# Patient Record
Sex: FEMALE | Race: BLACK OR AFRICAN AMERICAN | URBAN - METROPOLITAN AREA
[De-identification: names, ages, dates, MRNs, and addresses within clinical notes are randomized per-mention and may not be internally consistent; named-entity substitution may affect disease eponyms.]

---

## 2017-01-27 ENCOUNTER — INPATIENT (INPATIENT)
Facility: HOSPITAL | Age: 82
LOS: 20 days | Discharge: NURSING, REHAB OR LONG-TERM CARE | End: 2017-02-17
Attending: INTERNAL MEDICINE | Admitting: INTERNAL MEDICINE
Payer: MEDICARE

## 2017-01-27 VITALS
DIASTOLIC BLOOD PRESSURE: 49 MMHG | SYSTOLIC BLOOD PRESSURE: 104 MMHG | RESPIRATION RATE: 29 BRPM | HEART RATE: 86 BPM | OXYGEN SATURATION: 95 %

## 2017-01-27 DIAGNOSIS — A41.9 SEPSIS, UNSPECIFIED ORGANISM: ICD-10-CM

## 2017-01-27 PROBLEM — Z00.00 ENCOUNTER FOR PREVENTIVE HEALTH EXAMINATION: Status: ACTIVE | Noted: 2017-01-27

## 2017-01-27 LAB
ALBUMIN SERPL ELPH-MCNC: 2.3 G/DL — LOW (ref 3.3–5)
ALP SERPL-CCNC: 79 U/L — SIGNIFICANT CHANGE UP (ref 40–120)
ALT FLD-CCNC: 16 U/L — SIGNIFICANT CHANGE UP (ref 4–33)
APPEARANCE UR: SIGNIFICANT CHANGE UP
APTT BLD: 29.5 SEC — SIGNIFICANT CHANGE UP (ref 27.5–37.4)
AST SERPL-CCNC: 38 U/L — HIGH (ref 4–32)
B-OH-BUTYR SERPL-SCNC: 0.3 MMOL/L — SIGNIFICANT CHANGE UP (ref 0–0.4)
BACTERIA # UR AUTO: SIGNIFICANT CHANGE UP
BASE EXCESS BLDV CALC-SCNC: -8.5 MMOL/L — SIGNIFICANT CHANGE UP
BASE EXCESS BLDV CALC-SCNC: -8.8 MMOL/L — SIGNIFICANT CHANGE UP
BASOPHILS # BLD AUTO: 0.05 K/UL — SIGNIFICANT CHANGE UP (ref 0–0.2)
BASOPHILS NFR BLD AUTO: 0.2 % — SIGNIFICANT CHANGE UP (ref 0–2)
BASOPHILS NFR SPEC: 0 % — SIGNIFICANT CHANGE UP (ref 0–2)
BILIRUB SERPL-MCNC: 0.9 MG/DL — SIGNIFICANT CHANGE UP (ref 0.2–1.2)
BILIRUB UR-MCNC: NEGATIVE — SIGNIFICANT CHANGE UP
BLOOD GAS VENOUS - CREATININE: 3.77 MG/DL — HIGH (ref 0.5–1.3)
BLOOD GAS VENOUS - CREATININE: 3.89 MG/DL — HIGH (ref 0.5–1.3)
BLOOD UR QL VISUAL: NEGATIVE — SIGNIFICANT CHANGE UP
BUN SERPL-MCNC: 76 MG/DL — HIGH (ref 7–23)
CALCIUM SERPL-MCNC: 8.8 MG/DL — SIGNIFICANT CHANGE UP (ref 8.4–10.5)
CHLORIDE BLDV-SCNC: 101 MMOL/L — SIGNIFICANT CHANGE UP (ref 96–108)
CHLORIDE BLDV-SCNC: 103 MMOL/L — SIGNIFICANT CHANGE UP (ref 96–108)
CHLORIDE SERPL-SCNC: 96 MMOL/L — LOW (ref 98–107)
CK MB BLD-MCNC: 1.3 — SIGNIFICANT CHANGE UP (ref 0–2.5)
CK MB BLD-MCNC: 5.56 NG/ML — HIGH (ref 1–4.7)
CK SERPL-CCNC: 417 U/L — HIGH (ref 25–170)
CO2 SERPL-SCNC: 13 MMOL/L — LOW (ref 22–31)
COLOR SPEC: YELLOW — SIGNIFICANT CHANGE UP
CREAT SERPL-MCNC: 3.62 MG/DL — HIGH (ref 0.5–1.3)
EOSINOPHIL # BLD AUTO: 0.05 K/UL — SIGNIFICANT CHANGE UP (ref 0–0.5)
EOSINOPHIL NFR BLD AUTO: 0.2 % — SIGNIFICANT CHANGE UP (ref 0–6)
EOSINOPHIL NFR FLD: 0 % — SIGNIFICANT CHANGE UP (ref 0–6)
GAS PNL BLDV: 129 MMOL/L — LOW (ref 136–146)
GAS PNL BLDV: 129 MMOL/L — LOW (ref 136–146)
GLUCOSE BLDV-MCNC: 200 — HIGH (ref 70–99)
GLUCOSE BLDV-MCNC: 227 — HIGH (ref 70–99)
GLUCOSE SERPL-MCNC: 204 MG/DL — HIGH (ref 70–99)
GLUCOSE UR-MCNC: SIGNIFICANT CHANGE UP
HCO3 BLDV-SCNC: 16 MMOL/L — LOW (ref 20–27)
HCO3 BLDV-SCNC: 17 MMOL/L — LOW (ref 20–27)
HCT VFR BLD CALC: 34.1 % — LOW (ref 34.5–45)
HCT VFR BLDV CALC: 31.5 % — LOW (ref 34.5–45)
HCT VFR BLDV CALC: 34.9 % — SIGNIFICANT CHANGE UP (ref 34.5–45)
HGB BLD-MCNC: 11.1 G/DL — LOW (ref 11.5–15.5)
HGB BLDV-MCNC: 10.2 G/DL — LOW (ref 11.5–15.5)
HGB BLDV-MCNC: 11.3 G/DL — LOW (ref 11.5–15.5)
IMM GRANULOCYTES NFR BLD AUTO: 5.6 % — HIGH (ref 0–1.5)
INR BLD: 1.2 — HIGH (ref 0.87–1.18)
KETONES UR-MCNC: NEGATIVE — SIGNIFICANT CHANGE UP
LACTATE BLDV-MCNC: 3 MMOL/L — HIGH (ref 0.5–2)
LACTATE BLDV-MCNC: 5 MMOL/L — CRITICAL HIGH (ref 0.5–2)
LEUKOCYTE ESTERASE UR-ACNC: NEGATIVE — SIGNIFICANT CHANGE UP
LYMPHOCYTES # BLD AUTO: 1.28 K/UL — SIGNIFICANT CHANGE UP (ref 1–3.3)
LYMPHOCYTES # BLD AUTO: 4.1 % — LOW (ref 13–44)
LYMPHOCYTES NFR SPEC AUTO: 4 % — LOW (ref 13–44)
MANUAL SMEAR VERIFICATION: SIGNIFICANT CHANGE UP
MCHC RBC-ENTMCNC: 27.5 PG — SIGNIFICANT CHANGE UP (ref 27–34)
MCHC RBC-ENTMCNC: 32.6 % — SIGNIFICANT CHANGE UP (ref 32–36)
MCV RBC AUTO: 84.6 FL — SIGNIFICANT CHANGE UP (ref 80–100)
MONOCYTES # BLD AUTO: 0.22 K/UL — SIGNIFICANT CHANGE UP (ref 0–0.9)
MONOCYTES NFR BLD AUTO: 0.7 % — LOW (ref 2–14)
MONOCYTES NFR BLD: 3 % — SIGNIFICANT CHANGE UP (ref 2–9)
MORPHOLOGY BLD-IMP: NORMAL — SIGNIFICANT CHANGE UP
NEUTROPHIL AB SER-ACNC: 75 % — SIGNIFICANT CHANGE UP (ref 43–77)
NEUTROPHILS # BLD AUTO: 28.11 K/UL — HIGH (ref 1.8–7.4)
NEUTROPHILS NFR BLD AUTO: 89.2 % — HIGH (ref 43–77)
NEUTS BAND # BLD: 18 % — HIGH (ref 0–6)
NITRITE UR-MCNC: NEGATIVE — SIGNIFICANT CHANGE UP
PCO2 BLDV: 42 MMHG — SIGNIFICANT CHANGE UP (ref 41–51)
PCO2 BLDV: 48 MMHG — SIGNIFICANT CHANGE UP (ref 41–51)
PH BLDV: 7.2 PH — LOW (ref 7.32–7.43)
PH BLDV: 7.24 PH — LOW (ref 7.32–7.43)
PH UR: 5.5 — SIGNIFICANT CHANGE UP (ref 4.6–8)
PLATELET # BLD AUTO: 133 K/UL — LOW (ref 150–400)
PLATELET COUNT - ESTIMATE: SIGNIFICANT CHANGE UP
PMV BLD: 12.1 FL — SIGNIFICANT CHANGE UP (ref 7–13)
PO2 BLDV: 34 MMHG — LOW (ref 35–40)
PO2 BLDV: 55 MMHG — HIGH (ref 35–40)
POTASSIUM BLDV-SCNC: 5.1 MMOL/L — HIGH (ref 3.4–4.5)
POTASSIUM BLDV-SCNC: SIGNIFICANT CHANGE UP MMOL/L (ref 3.4–4.5)
POTASSIUM SERPL-MCNC: 6 MMOL/L — HIGH (ref 3.5–5.3)
POTASSIUM SERPL-SCNC: 6 MMOL/L — HIGH (ref 3.5–5.3)
PROT SERPL-MCNC: 6.5 G/DL — SIGNIFICANT CHANGE UP (ref 6–8.3)
PROT UR-MCNC: 150 — HIGH
PROTHROM AB SERPL-ACNC: 13.7 SEC — HIGH (ref 10–13.1)
RBC # BLD: 4.03 M/UL — SIGNIFICANT CHANGE UP (ref 3.8–5.2)
RBC # FLD: 13.8 % — SIGNIFICANT CHANGE UP (ref 10.3–14.5)
RBC CASTS # UR COMP ASSIST: SIGNIFICANT CHANGE UP (ref 0–?)
SAO2 % BLDV: 49.8 % — LOW (ref 60–85)
SAO2 % BLDV: 81.2 % — SIGNIFICANT CHANGE UP (ref 60–85)
SODIUM SERPL-SCNC: 133 MMOL/L — LOW (ref 135–145)
SP GR SPEC: 1.02 — SIGNIFICANT CHANGE UP (ref 1–1.03)
SQUAMOUS # UR AUTO: SIGNIFICANT CHANGE UP
TROPONIN T SERPL-MCNC: < 0.06 NG/ML — SIGNIFICANT CHANGE UP (ref 0–0.06)
UROBILINOGEN FLD QL: NORMAL E.U. — SIGNIFICANT CHANGE UP (ref 0.1–0.2)
WBC # BLD: 31.47 K/UL — HIGH (ref 3.8–10.5)
WBC # FLD AUTO: 31.47 K/UL — HIGH (ref 3.8–10.5)
WBC UR QL: SIGNIFICANT CHANGE UP (ref 0–?)

## 2017-01-27 PROCEDURE — 71010: CPT | Mod: 26

## 2017-01-27 RX ORDER — PIPERACILLIN AND TAZOBACTAM 4; .5 G/20ML; G/20ML
3.38 INJECTION, POWDER, LYOPHILIZED, FOR SOLUTION INTRAVENOUS ONCE
Qty: 0 | Refills: 0 | Status: COMPLETED | OUTPATIENT
Start: 2017-01-27 | End: 2017-01-27

## 2017-01-27 RX ORDER — ACETAMINOPHEN 500 MG
650 TABLET ORAL EVERY 6 HOURS
Qty: 0 | Refills: 0 | Status: DISCONTINUED | OUTPATIENT
Start: 2017-01-27 | End: 2017-02-17

## 2017-01-27 RX ORDER — ONDANSETRON 8 MG/1
4 TABLET, FILM COATED ORAL ONCE
Qty: 0 | Refills: 0 | Status: COMPLETED | OUTPATIENT
Start: 2017-01-27 | End: 2017-01-27

## 2017-01-27 RX ORDER — CEFTRIAXONE 500 MG/1
2 INJECTION, POWDER, FOR SOLUTION INTRAMUSCULAR; INTRAVENOUS EVERY 24 HOURS
Qty: 0 | Refills: 0 | Status: DISCONTINUED | OUTPATIENT
Start: 2017-01-28 | End: 2017-01-28

## 2017-01-27 RX ORDER — SODIUM CHLORIDE 9 MG/ML
1000 INJECTION INTRAMUSCULAR; INTRAVENOUS; SUBCUTANEOUS
Qty: 0 | Refills: 0 | Status: DISCONTINUED | OUTPATIENT
Start: 2017-01-27 | End: 2017-01-28

## 2017-01-27 RX ORDER — AZITHROMYCIN 500 MG/1
TABLET, FILM COATED ORAL
Qty: 0 | Refills: 0 | Status: DISCONTINUED | OUTPATIENT
Start: 2017-01-27 | End: 2017-01-29

## 2017-01-27 RX ORDER — IPRATROPIUM/ALBUTEROL SULFATE 18-103MCG
3 AEROSOL WITH ADAPTER (GRAM) INHALATION ONCE
Qty: 0 | Refills: 0 | Status: COMPLETED | OUTPATIENT
Start: 2017-01-27 | End: 2017-01-27

## 2017-01-27 RX ORDER — ACETAMINOPHEN 500 MG
1000 TABLET ORAL ONCE
Qty: 0 | Refills: 0 | Status: COMPLETED | OUTPATIENT
Start: 2017-01-27 | End: 2017-01-27

## 2017-01-27 RX ORDER — SODIUM CHLORIDE 9 MG/ML
1000 INJECTION INTRAMUSCULAR; INTRAVENOUS; SUBCUTANEOUS ONCE
Qty: 0 | Refills: 0 | Status: DISCONTINUED | OUTPATIENT
Start: 2017-01-27 | End: 2017-01-27

## 2017-01-27 RX ORDER — CEFTRIAXONE 500 MG/1
INJECTION, POWDER, FOR SOLUTION INTRAMUSCULAR; INTRAVENOUS
Qty: 0 | Refills: 0 | Status: DISCONTINUED | OUTPATIENT
Start: 2017-01-27 | End: 2017-01-28

## 2017-01-27 RX ORDER — VANCOMYCIN HCL 1 G
1000 VIAL (EA) INTRAVENOUS ONCE
Qty: 0 | Refills: 0 | Status: COMPLETED | OUTPATIENT
Start: 2017-01-27 | End: 2017-01-27

## 2017-01-27 RX ORDER — AZITHROMYCIN 500 MG/1
500 TABLET, FILM COATED ORAL ONCE
Qty: 0 | Refills: 0 | Status: COMPLETED | OUTPATIENT
Start: 2017-01-27 | End: 2017-01-27

## 2017-01-27 RX ORDER — HEPARIN SODIUM 5000 [USP'U]/ML
5000 INJECTION INTRAVENOUS; SUBCUTANEOUS EVERY 8 HOURS
Qty: 0 | Refills: 0 | Status: DISCONTINUED | OUTPATIENT
Start: 2017-01-27 | End: 2017-01-31

## 2017-01-27 RX ORDER — CEFTRIAXONE 500 MG/1
2 INJECTION, POWDER, FOR SOLUTION INTRAMUSCULAR; INTRAVENOUS ONCE
Qty: 0 | Refills: 0 | Status: DISCONTINUED | OUTPATIENT
Start: 2017-01-27 | End: 2017-01-28

## 2017-01-27 RX ORDER — SODIUM CHLORIDE 9 MG/ML
1000 INJECTION INTRAMUSCULAR; INTRAVENOUS; SUBCUTANEOUS ONCE
Qty: 0 | Refills: 0 | Status: COMPLETED | OUTPATIENT
Start: 2017-01-27 | End: 2017-01-27

## 2017-01-27 RX ORDER — AZITHROMYCIN 500 MG/1
500 TABLET, FILM COATED ORAL EVERY 24 HOURS
Qty: 0 | Refills: 0 | Status: DISCONTINUED | OUTPATIENT
Start: 2017-01-28 | End: 2017-01-29

## 2017-01-27 RX ADMIN — Medication 3 MILLILITER(S): at 16:16

## 2017-01-27 RX ADMIN — Medication 250 MILLIGRAM(S): at 16:38

## 2017-01-27 RX ADMIN — PIPERACILLIN AND TAZOBACTAM 200 GRAM(S): 4; .5 INJECTION, POWDER, LYOPHILIZED, FOR SOLUTION INTRAVENOUS at 16:08

## 2017-01-27 RX ADMIN — Medication 400 MILLIGRAM(S): at 18:59

## 2017-01-27 RX ADMIN — SODIUM CHLORIDE 500 MILLILITER(S): 9 INJECTION INTRAMUSCULAR; INTRAVENOUS; SUBCUTANEOUS at 19:00

## 2017-01-27 RX ADMIN — SODIUM CHLORIDE 1000 MILLILITER(S): 9 INJECTION INTRAMUSCULAR; INTRAVENOUS; SUBCUTANEOUS at 16:07

## 2017-01-27 RX ADMIN — SODIUM CHLORIDE 500 MILLILITER(S): 9 INJECTION INTRAMUSCULAR; INTRAVENOUS; SUBCUTANEOUS at 18:51

## 2017-01-27 RX ADMIN — ONDANSETRON 4 MILLIGRAM(S): 8 TABLET, FILM COATED ORAL at 16:08

## 2017-01-27 NOTE — ED ADULT NURSE NOTE - OBJECTIVE STATEMENT
received pt lethargic, responsive to verbal stimuli. IVLs intact, no s/s of infiltration noted at this time. IVF and IV medication infusing as per MD order. family at bedside. 14F goff placed with dark yellow urine draining. family at bedside. cardiac monitor in place. 02 sat on NRM 85%. Bipap started as per MD order,. 02 sat 94%. dispo pending

## 2017-01-27 NOTE — ED PROVIDER NOTE - PMH
<<----- Click to add NO pertinent Past Medical History DM (diabetes mellitus)    HLD (hyperlipidemia)    HTN (hypertension)

## 2017-01-27 NOTE — ED ADULT NURSE REASSESSMENT NOTE - NS ED NURSE REASSESS COMMENT FT1
pt on bed awake  relatives at bedside reports Pt is more awake right now prior ED visit. reports productive cough for 3 days on ABX for Bronchitis but worsening  on cm sinus rhythm ON BIPAP I 12 E 7 O2 80%

## 2017-01-27 NOTE — ED ADULT TRIAGE NOTE - CHIEF COMPLAINT QUOTE
Pt BIBA notification for altered mental status and respiratory distress, pt arrived tachypneic and lethargic on NRB, pt to RM 5

## 2017-01-27 NOTE — ED PROVIDER NOTE - ATTENDING CONTRIBUTION TO CARE
Evaluated, examined and discussed at length with Dr. Chu and agree with continued evaluation and management plans....Lissette..

## 2017-01-27 NOTE — ED PROVIDER NOTE - OBJECTIVE STATEMENT
89 YOF PMH DM, HTN, HLD, recent DX of bronchitis 1 week ago p/w 3 days of AMS, worsening SOB.  PT Tx for bronchitis by PCP one week ago on course of Azithromycin.  Pt acutely worsened 3 days ago with exertional dyspnea, productive cough and worsening mental status.  Today PT acutely SOB altered family called EMS. Pt arrives altered in respiratory distress unable to complete ROS today.

## 2017-01-27 NOTE — ED PROVIDER NOTE - MEDICAL DECISION MAKING DETAILS
89 yo woman brought to ED via EMS after increasing symptoms of fatigue, shortness of breath and generalized weakness over the last 3 days.  A week ago had increased coughing and "rattling" in chest and was treated for bronchitis with azithromycin.  Was noted by family on Wednesday to be sitting on top of steps, too tired to get up after struggling to climb them.  Persisted with fatigue over next two days with some "rattling" in chest noted.  Today was listing to the left side, less alert and so EMS called.  Low sats via EMS.  Exam here with /70  RR 28  Sat 93 on O2  afebrile.  Pink conj, anicteric. Dry muc membs.  No overt facial droop.  Neck supple, no stridor.  Lungs with coarse breath sounds bilateral bases.  no wheezes.  Heart without murmur. Abdomen soft and nontender throughout.  Extremities without edema, calf pain or tenderness.  No rash.  Left upper extremity with decreased elevation and  strength.  Clinically with obvious air hunger and hypoxic in spite of increased efforts with need for additional airway supplementation acutely.  Family confirms wishes that all measures, including intubation be carried out for care.  Will assess for pneumonia, failure component? and now consider central event as part of worsening considering slight leftward leaning slight weakness of left arm compared to right.  Bipap now being started.  Will ask MICU to evaluate.  Will follow with Dr. Chu.....Lissette

## 2017-01-27 NOTE — ED PROVIDER NOTE - PROGRESS NOTE DETAILS
JW: 18% bands, lactate above 5, WBC 30. Pt has sever sepsis likely from pulmonary source. TX with ABX.  Will restart fluids since CHF less likely.  MICU consulted. TX symptomatically.  PT on BIPAP SATS 95% RR 30.  Will continue to observe admit to ICU. JW GO discussion family states PT full code.  Discussed prognosis PT full code. JW Hyperkalemia, acidemia, BENJI.  AG 20. Pt now stable on BIPAP maintaining hemodynamic stability.  Will repeat lactate continue to TX for sepsis. Pt admitted to PCU.  Discussed with MICU ATTDG and resident.

## 2017-01-27 NOTE — ED ADULT NURSE REASSESSMENT NOTE - NS ED NURSE REASSESS COMMENT FT1
I [id number] to give report claimed they are not aware yet that they are getting a pt.  Bed management was called and verified I [id number] to give report claimed they are not aware yet that they are getting a pt.  Bed management was called and verified 2010hrs Bed Board is called No one answer CN made aware that the RN at 645a not aware they are getting a pt.

## 2017-01-28 DIAGNOSIS — E11.9 TYPE 2 DIABETES MELLITUS WITHOUT COMPLICATIONS: ICD-10-CM

## 2017-01-28 DIAGNOSIS — A41.9 SEPSIS, UNSPECIFIED ORGANISM: ICD-10-CM

## 2017-01-28 DIAGNOSIS — Z41.8 ENCOUNTER FOR OTHER PROCEDURES FOR PURPOSES OTHER THAN REMEDYING HEALTH STATE: ICD-10-CM

## 2017-01-28 DIAGNOSIS — E87.2 ACIDOSIS: ICD-10-CM

## 2017-01-28 DIAGNOSIS — E78.5 HYPERLIPIDEMIA, UNSPECIFIED: ICD-10-CM

## 2017-01-28 DIAGNOSIS — E87.1 HYPO-OSMOLALITY AND HYPONATREMIA: ICD-10-CM

## 2017-01-28 DIAGNOSIS — I10 ESSENTIAL (PRIMARY) HYPERTENSION: ICD-10-CM

## 2017-01-28 DIAGNOSIS — R74.8 ABNORMAL LEVELS OF OTHER SERUM ENZYMES: ICD-10-CM

## 2017-01-28 DIAGNOSIS — J96.91 RESPIRATORY FAILURE, UNSPECIFIED WITH HYPOXIA: ICD-10-CM

## 2017-01-28 DIAGNOSIS — N17.9 ACUTE KIDNEY FAILURE, UNSPECIFIED: ICD-10-CM

## 2017-01-28 LAB
ALBUMIN SERPL ELPH-MCNC: 2 G/DL — LOW (ref 3.3–5)
ALBUMIN SERPL ELPH-MCNC: 2.1 G/DL — LOW (ref 3.3–5)
ALP SERPL-CCNC: 67 U/L — SIGNIFICANT CHANGE UP (ref 40–120)
ALP SERPL-CCNC: 81 U/L — SIGNIFICANT CHANGE UP (ref 40–120)
ALT FLD-CCNC: 23 U/L — SIGNIFICANT CHANGE UP (ref 4–33)
ALT FLD-CCNC: 26 U/L — SIGNIFICANT CHANGE UP (ref 4–33)
APTT BLD: 32.8 SEC — SIGNIFICANT CHANGE UP (ref 27.5–37.4)
AST SERPL-CCNC: 55 U/L — HIGH (ref 4–32)
AST SERPL-CCNC: 65 U/L — HIGH (ref 4–32)
B PERT DNA SPEC QL NAA+PROBE: SIGNIFICANT CHANGE UP
BASE EXCESS BLDA CALC-SCNC: -10.4 MMOL/L — SIGNIFICANT CHANGE UP
BASE EXCESS BLDA CALC-SCNC: -8.3 MMOL/L — SIGNIFICANT CHANGE UP
BASE EXCESS BLDA CALC-SCNC: -8.4 MMOL/L — SIGNIFICANT CHANGE UP
BASE EXCESS BLDA CALC-SCNC: -9.8 MMOL/L — SIGNIFICANT CHANGE UP
BASOPHILS # BLD AUTO: 0.04 K/UL — SIGNIFICANT CHANGE UP (ref 0–0.2)
BASOPHILS NFR BLD AUTO: 0.1 % — SIGNIFICANT CHANGE UP (ref 0–2)
BASOPHILS NFR SPEC: 0 % — SIGNIFICANT CHANGE UP (ref 0–2)
BILIRUB SERPL-MCNC: 0.7 MG/DL — SIGNIFICANT CHANGE UP (ref 0.2–1.2)
BILIRUB SERPL-MCNC: 0.9 MG/DL — SIGNIFICANT CHANGE UP (ref 0.2–1.2)
BUN SERPL-MCNC: 80 MG/DL — HIGH (ref 7–23)
BUN SERPL-MCNC: 82 MG/DL — HIGH (ref 7–23)
BUN SERPL-MCNC: 90 MG/DL — HIGH (ref 7–23)
BUN SERPL-MCNC: 94 MG/DL — HIGH (ref 7–23)
BUN SERPL-MCNC: 94 MG/DL — HIGH (ref 7–23)
BURR CELLS BLD QL SMEAR: SLIGHT — SIGNIFICANT CHANGE UP
C PNEUM DNA SPEC QL NAA+PROBE: NOT DETECTED — SIGNIFICANT CHANGE UP
CA-I BLDA-SCNC: 1.15 MMOL/L — SIGNIFICANT CHANGE UP (ref 1.15–1.29)
CA-I BLDA-SCNC: 1.18 MMOL/L — SIGNIFICANT CHANGE UP (ref 1.15–1.29)
CALCIUM SERPL-MCNC: 8.1 MG/DL — LOW (ref 8.4–10.5)
CALCIUM SERPL-MCNC: 8.3 MG/DL — LOW (ref 8.4–10.5)
CALCIUM SERPL-MCNC: 8.4 MG/DL — SIGNIFICANT CHANGE UP (ref 8.4–10.5)
CALCIUM SERPL-MCNC: 8.6 MG/DL — SIGNIFICANT CHANGE UP (ref 8.4–10.5)
CALCIUM SERPL-MCNC: 8.6 MG/DL — SIGNIFICANT CHANGE UP (ref 8.4–10.5)
CHLORIDE BLDA-SCNC: 107 MMOL/L — SIGNIFICANT CHANGE UP (ref 96–108)
CHLORIDE SERPL-SCNC: 100 MMOL/L — SIGNIFICANT CHANGE UP (ref 98–107)
CHLORIDE SERPL-SCNC: 100 MMOL/L — SIGNIFICANT CHANGE UP (ref 98–107)
CHLORIDE SERPL-SCNC: 96 MMOL/L — LOW (ref 98–107)
CHLORIDE SERPL-SCNC: 98 MMOL/L — SIGNIFICANT CHANGE UP (ref 98–107)
CHLORIDE SERPL-SCNC: 99 MMOL/L — SIGNIFICANT CHANGE UP (ref 98–107)
CHOLEST SERPL-MCNC: 67 MG/DL — LOW (ref 120–199)
CK MB BLD-MCNC: 23.19 NG/ML — HIGH (ref 1–4.7)
CK SERPL-CCNC: 1410 U/L — HIGH (ref 25–170)
CO2 SERPL-SCNC: 13 MMOL/L — LOW (ref 22–31)
CO2 SERPL-SCNC: 13 MMOL/L — LOW (ref 22–31)
CO2 SERPL-SCNC: 14 MMOL/L — LOW (ref 22–31)
CO2 SERPL-SCNC: 16 MMOL/L — LOW (ref 22–31)
CO2 SERPL-SCNC: 18 MMOL/L — LOW (ref 22–31)
CREAT BLDA-MCNC: 4.62 MG/DL — HIGH (ref 0.5–1.3)
CREAT SERPL-MCNC: 3.73 MG/DL — HIGH (ref 0.5–1.3)
CREAT SERPL-MCNC: 3.88 MG/DL — HIGH (ref 0.5–1.3)
CREAT SERPL-MCNC: 4.24 MG/DL — HIGH (ref 0.5–1.3)
CREAT SERPL-MCNC: 4.45 MG/DL — HIGH (ref 0.5–1.3)
CREAT SERPL-MCNC: 4.45 MG/DL — HIGH (ref 0.5–1.3)
EOSINOPHIL # BLD AUTO: 0.01 K/UL — SIGNIFICANT CHANGE UP (ref 0–0.5)
EOSINOPHIL NFR BLD AUTO: 0 % — SIGNIFICANT CHANGE UP (ref 0–6)
EOSINOPHIL NFR FLD: 0 % — SIGNIFICANT CHANGE UP (ref 0–6)
FLUAV H1 2009 PAND RNA SPEC QL NAA+PROBE: NOT DETECTED — SIGNIFICANT CHANGE UP
FLUAV H1 RNA SPEC QL NAA+PROBE: NOT DETECTED — SIGNIFICANT CHANGE UP
FLUAV H3 RNA SPEC QL NAA+PROBE: NOT DETECTED — SIGNIFICANT CHANGE UP
FLUAV SUBTYP SPEC NAA+PROBE: SIGNIFICANT CHANGE UP
FLUBV RNA SPEC QL NAA+PROBE: NOT DETECTED — SIGNIFICANT CHANGE UP
GLUCOSE BLDA-MCNC: 201 MG/DL — HIGH (ref 70–99)
GLUCOSE BLDA-MCNC: 244 MG/DL — HIGH (ref 70–99)
GLUCOSE BLDA-MCNC: 326 MG/DL — HIGH (ref 70–99)
GLUCOSE SERPL-MCNC: 195 MG/DL — HIGH (ref 70–99)
GLUCOSE SERPL-MCNC: 195 MG/DL — HIGH (ref 70–99)
GLUCOSE SERPL-MCNC: 222 MG/DL — HIGH (ref 70–99)
GLUCOSE SERPL-MCNC: 284 MG/DL — HIGH (ref 70–99)
GLUCOSE SERPL-MCNC: 286 MG/DL — HIGH (ref 70–99)
HADV DNA SPEC QL NAA+PROBE: NOT DETECTED — SIGNIFICANT CHANGE UP
HBA1C BLD-MCNC: 7 % — HIGH (ref 4–5.6)
HCO3 BLDA-SCNC: 16 MMOL/L — LOW (ref 22–26)
HCO3 BLDA-SCNC: 17 MMOL/L — LOW (ref 22–26)
HCO3 BLDA-SCNC: 17 MMOL/L — LOW (ref 22–26)
HCO3 BLDA-SCNC: 18 MMOL/L — LOW (ref 22–26)
HCOV 229E RNA SPEC QL NAA+PROBE: NOT DETECTED — SIGNIFICANT CHANGE UP
HCOV HKU1 RNA SPEC QL NAA+PROBE: NOT DETECTED — SIGNIFICANT CHANGE UP
HCOV NL63 RNA SPEC QL NAA+PROBE: NOT DETECTED — SIGNIFICANT CHANGE UP
HCOV OC43 RNA SPEC QL NAA+PROBE: NOT DETECTED — SIGNIFICANT CHANGE UP
HCT VFR BLD CALC: 29 % — LOW (ref 34.5–45)
HCT VFR BLD CALC: 29.2 % — LOW (ref 34.5–45)
HCT VFR BLDA CALC: 27.2 % — LOW (ref 34.5–46.5)
HCT VFR BLDA CALC: 27.4 % — LOW (ref 34.5–46.5)
HCT VFR BLDA CALC: 29 % — LOW (ref 34.5–46.5)
HDLC SERPL-MCNC: 7 MG/DL — LOW (ref 45–65)
HGB BLD-MCNC: 9.2 G/DL — LOW (ref 11.5–15.5)
HGB BLD-MCNC: 9.3 G/DL — LOW (ref 11.5–15.5)
HGB BLDA-MCNC: 8.8 G/DL — LOW (ref 11.5–15.5)
HGB BLDA-MCNC: 8.8 G/DL — LOW (ref 11.5–15.5)
HGB BLDA-MCNC: 9.3 G/DL — LOW (ref 11.5–15.5)
HMPV RNA SPEC QL NAA+PROBE: NOT DETECTED — SIGNIFICANT CHANGE UP
HPIV1 RNA SPEC QL NAA+PROBE: NOT DETECTED — SIGNIFICANT CHANGE UP
HPIV2 RNA SPEC QL NAA+PROBE: NOT DETECTED — SIGNIFICANT CHANGE UP
HPIV3 RNA SPEC QL NAA+PROBE: NOT DETECTED — SIGNIFICANT CHANGE UP
HPIV4 RNA SPEC QL NAA+PROBE: NOT DETECTED — SIGNIFICANT CHANGE UP
IMM GRANULOCYTES NFR BLD AUTO: 0.8 % — SIGNIFICANT CHANGE UP (ref 0–1.5)
INR BLD: 1.18 — SIGNIFICANT CHANGE UP (ref 0.87–1.18)
LACTATE BLDA-SCNC: 1.2 MMOL/L — SIGNIFICANT CHANGE UP (ref 0.5–2)
LACTATE BLDA-SCNC: 1.9 MMOL/L — SIGNIFICANT CHANGE UP (ref 0.5–2)
LACTATE BLDA-SCNC: 3.2 MMOL/L — HIGH (ref 0.5–2)
LACTATE SERPL-SCNC: 1.5 MMOL/L — SIGNIFICANT CHANGE UP (ref 0.5–2)
LIPID PNL WITH DIRECT LDL SERPL: 3 MG/DL — SIGNIFICANT CHANGE UP
LYMPHOCYTES # BLD AUTO: 1.29 K/UL — SIGNIFICANT CHANGE UP (ref 1–3.3)
LYMPHOCYTES # BLD AUTO: 3.3 % — LOW (ref 13–44)
LYMPHOCYTES NFR SPEC AUTO: 4 % — LOW (ref 13–44)
M PNEUMO DNA SPEC QL NAA+PROBE: NOT DETECTED — SIGNIFICANT CHANGE UP
MAGNESIUM SERPL-MCNC: 1.9 MG/DL — SIGNIFICANT CHANGE UP (ref 1.6–2.6)
MAGNESIUM SERPL-MCNC: 2 MG/DL — SIGNIFICANT CHANGE UP (ref 1.6–2.6)
MAGNESIUM SERPL-MCNC: 2 MG/DL — SIGNIFICANT CHANGE UP (ref 1.6–2.6)
MANUAL SMEAR VERIFICATION: SIGNIFICANT CHANGE UP
MCHC RBC-ENTMCNC: 27.5 PG — SIGNIFICANT CHANGE UP (ref 27–34)
MCHC RBC-ENTMCNC: 27.6 PG — SIGNIFICANT CHANGE UP (ref 27–34)
MCHC RBC-ENTMCNC: 31.7 % — LOW (ref 32–36)
MCHC RBC-ENTMCNC: 31.8 % — LOW (ref 32–36)
MCV RBC AUTO: 86.6 FL — SIGNIFICANT CHANGE UP (ref 80–100)
MCV RBC AUTO: 86.8 FL — SIGNIFICANT CHANGE UP (ref 80–100)
METAMYELOCYTES # FLD: 2 % — HIGH (ref 0–1)
MONOCYTES # BLD AUTO: 0.84 K/UL — SIGNIFICANT CHANGE UP (ref 0–0.9)
MONOCYTES NFR BLD AUTO: 2.2 % — SIGNIFICANT CHANGE UP (ref 2–14)
MONOCYTES NFR BLD: 5 % — SIGNIFICANT CHANGE UP (ref 2–9)
MYELOCYTES NFR BLD: 1 % — HIGH (ref 0–0)
NEUTROPHIL AB SER-ACNC: 80 % — HIGH (ref 43–77)
NEUTROPHILS # BLD AUTO: 36.32 K/UL — HIGH (ref 1.8–7.4)
NEUTROPHILS NFR BLD AUTO: 93.6 % — HIGH (ref 43–77)
NEUTS BAND # BLD: 8 % — HIGH (ref 0–6)
OVALOCYTES BLD QL SMEAR: SLIGHT — SIGNIFICANT CHANGE UP
PCO2 BLDA: 35 MMHG — SIGNIFICANT CHANGE UP (ref 32–48)
PCO2 BLDA: 37 MMHG — SIGNIFICANT CHANGE UP (ref 32–48)
PCO2 BLDA: 39 MMHG — SIGNIFICANT CHANGE UP (ref 32–48)
PCO2 BLDA: 43 MMHG — SIGNIFICANT CHANGE UP (ref 32–48)
PH BLDA: 7.2 PH — CRITICAL LOW (ref 7.35–7.45)
PH BLDA: 7.27 PH — LOW (ref 7.35–7.45)
PH BLDA: 7.27 PH — LOW (ref 7.35–7.45)
PH BLDA: 7.28 PH — LOW (ref 7.35–7.45)
PHOSPHATE SERPL-MCNC: 5.1 MG/DL — HIGH (ref 2.5–4.5)
PHOSPHATE SERPL-MCNC: 5.2 MG/DL — HIGH (ref 2.5–4.5)
PHOSPHATE SERPL-MCNC: 5.7 MG/DL — HIGH (ref 2.5–4.5)
PLATELET # BLD AUTO: 85 K/UL — LOW (ref 150–400)
PLATELET # BLD AUTO: 86 K/UL — LOW (ref 150–400)
PLATELET COUNT - ESTIMATE: SIGNIFICANT CHANGE UP
PMV BLD: 11.6 FL — SIGNIFICANT CHANGE UP (ref 7–13)
PMV BLD: 11.8 FL — SIGNIFICANT CHANGE UP (ref 7–13)
PO2 BLDA: 114 MMHG — HIGH (ref 83–108)
PO2 BLDA: 120 MMHG — HIGH (ref 83–108)
PO2 BLDA: 73 MMHG — LOW (ref 83–108)
PO2 BLDA: 81 MMHG — LOW (ref 83–108)
POIKILOCYTOSIS BLD QL AUTO: SLIGHT — SIGNIFICANT CHANGE UP
POTASSIUM BLDA-SCNC: 4.7 MMOL/L — HIGH (ref 3.4–4.5)
POTASSIUM BLDA-SCNC: 4.9 MMOL/L — HIGH (ref 3.4–4.5)
POTASSIUM BLDA-SCNC: 5 MMOL/L — HIGH (ref 3.4–4.5)
POTASSIUM SERPL-MCNC: 5.4 MMOL/L — HIGH (ref 3.5–5.3)
POTASSIUM SERPL-MCNC: 5.7 MMOL/L — HIGH (ref 3.5–5.3)
POTASSIUM SERPL-SCNC: 5.4 MMOL/L — HIGH (ref 3.5–5.3)
POTASSIUM SERPL-SCNC: 5.7 MMOL/L — HIGH (ref 3.5–5.3)
PROT SERPL-MCNC: 5.7 G/DL — LOW (ref 6–8.3)
PROT SERPL-MCNC: 5.9 G/DL — LOW (ref 6–8.3)
PROTHROM AB SERPL-ACNC: 13.5 SEC — HIGH (ref 10–13.1)
RBC # BLD: 3.34 M/UL — LOW (ref 3.8–5.2)
RBC # BLD: 3.37 M/UL — LOW (ref 3.8–5.2)
RBC # FLD: 14.2 % — SIGNIFICANT CHANGE UP (ref 10.3–14.5)
RBC # FLD: 14.4 % — SIGNIFICANT CHANGE UP (ref 10.3–14.5)
RSV RNA SPEC QL NAA+PROBE: NOT DETECTED — SIGNIFICANT CHANGE UP
RV+EV RNA SPEC QL NAA+PROBE: NOT DETECTED — SIGNIFICANT CHANGE UP
SAO2 % BLDA: 92.2 % — LOW (ref 95–99)
SAO2 % BLDA: 95.9 % — SIGNIFICANT CHANGE UP (ref 95–99)
SAO2 % BLDA: 97.5 % — SIGNIFICANT CHANGE UP (ref 95–99)
SAO2 % BLDA: 97.8 % — SIGNIFICANT CHANGE UP (ref 95–99)
SODIUM BLDA-SCNC: 128 MMOL/L — LOW (ref 136–146)
SODIUM BLDA-SCNC: 131 MMOL/L — LOW (ref 136–146)
SODIUM BLDA-SCNC: 132 MMOL/L — LOW (ref 136–146)
SODIUM SERPL-SCNC: 133 MMOL/L — LOW (ref 135–145)
SODIUM SERPL-SCNC: 134 MMOL/L — LOW (ref 135–145)
SODIUM SERPL-SCNC: 134 MMOL/L — LOW (ref 135–145)
SODIUM SERPL-SCNC: 136 MMOL/L — SIGNIFICANT CHANGE UP (ref 135–145)
SODIUM SERPL-SCNC: 136 MMOL/L — SIGNIFICANT CHANGE UP (ref 135–145)
SPECIMEN SOURCE: SIGNIFICANT CHANGE UP
SPECIMEN SOURCE: SIGNIFICANT CHANGE UP
TRIGL SERPL-MCNC: 150 MG/DL — HIGH (ref 10–149)
TROPONIN T SERPL-MCNC: < 0.06 NG/ML — SIGNIFICANT CHANGE UP (ref 0–0.06)
VANCOMYCIN FLD-MCNC: 9 UG/ML — SIGNIFICANT CHANGE UP
VANCOMYCIN TROUGH SERPL-MCNC: 7.4 UG/ML — LOW (ref 10–20)
WBC # BLD: 31.61 K/UL — HIGH (ref 3.8–10.5)
WBC # BLD: 38.8 K/UL — HIGH (ref 3.8–10.5)
WBC # FLD AUTO: 31.61 K/UL — HIGH (ref 3.8–10.5)
WBC # FLD AUTO: 38.8 K/UL — HIGH (ref 3.8–10.5)

## 2017-01-28 PROCEDURE — 31500 INSERT EMERGENCY AIRWAY: CPT | Mod: GC

## 2017-01-28 PROCEDURE — 43752 NASAL/OROGASTRIC W/TUBE PLMT: CPT

## 2017-01-28 PROCEDURE — 36600 WITHDRAWAL OF ARTERIAL BLOOD: CPT | Mod: 59

## 2017-01-28 PROCEDURE — 99222 1ST HOSP IP/OBS MODERATE 55: CPT | Mod: GC

## 2017-01-28 PROCEDURE — 76937 US GUIDE VASCULAR ACCESS: CPT | Mod: 26

## 2017-01-28 PROCEDURE — 99233 SBSQ HOSP IP/OBS HIGH 50: CPT | Mod: GC

## 2017-01-28 PROCEDURE — 94002 VENT MGMT INPAT INIT DAY: CPT | Mod: 59,GC

## 2017-01-28 PROCEDURE — 71010: CPT | Mod: 26

## 2017-01-28 PROCEDURE — 93010 ELECTROCARDIOGRAM REPORT: CPT

## 2017-01-28 RX ORDER — INSULIN LISPRO 100/ML
VIAL (ML) SUBCUTANEOUS EVERY 6 HOURS
Qty: 0 | Refills: 0 | Status: DISCONTINUED | OUTPATIENT
Start: 2017-01-28 | End: 2017-02-01

## 2017-01-28 RX ORDER — DEXTROSE 50 % IN WATER 50 %
1 SYRINGE (ML) INTRAVENOUS ONCE
Qty: 0 | Refills: 0 | Status: DISCONTINUED | OUTPATIENT
Start: 2017-01-28 | End: 2017-02-17

## 2017-01-28 RX ORDER — NOREPINEPHRINE BITARTRATE/D5W 8 MG/250ML
0.1 PLASTIC BAG, INJECTION (ML) INTRAVENOUS
Qty: 8 | Refills: 0 | Status: DISCONTINUED | OUTPATIENT
Start: 2017-01-28 | End: 2017-01-31

## 2017-01-28 RX ORDER — PROPOFOL 10 MG/ML
40 INJECTION, EMULSION INTRAVENOUS
Qty: 1000 | Refills: 0 | Status: DISCONTINUED | OUTPATIENT
Start: 2017-01-28 | End: 2017-01-31

## 2017-01-28 RX ORDER — INSULIN HUMAN 100 [IU]/ML
10 INJECTION, SOLUTION SUBCUTANEOUS ONCE
Qty: 0 | Refills: 0 | Status: COMPLETED | OUTPATIENT
Start: 2017-01-28 | End: 2017-01-28

## 2017-01-28 RX ORDER — SODIUM CHLORIDE 9 MG/ML
1000 INJECTION INTRAMUSCULAR; INTRAVENOUS; SUBCUTANEOUS
Qty: 0 | Refills: 0 | Status: DISCONTINUED | OUTPATIENT
Start: 2017-01-28 | End: 2017-01-28

## 2017-01-28 RX ORDER — IPRATROPIUM BROMIDE 0.2 MG/ML
1 SOLUTION, NON-ORAL INHALATION EVERY 6 HOURS
Qty: 0 | Refills: 0 | Status: DISCONTINUED | OUTPATIENT
Start: 2017-01-28 | End: 2017-02-06

## 2017-01-28 RX ORDER — MIDAZOLAM HYDROCHLORIDE 1 MG/ML
4 INJECTION, SOLUTION INTRAMUSCULAR; INTRAVENOUS ONCE
Qty: 0 | Refills: 0 | Status: DISCONTINUED | OUTPATIENT
Start: 2017-01-28 | End: 2017-01-28

## 2017-01-28 RX ORDER — GLUCAGON INJECTION, SOLUTION 0.5 MG/.1ML
1 INJECTION, SOLUTION SUBCUTANEOUS ONCE
Qty: 0 | Refills: 0 | Status: DISCONTINUED | OUTPATIENT
Start: 2017-01-28 | End: 2017-02-17

## 2017-01-28 RX ORDER — SODIUM CHLORIDE 9 MG/ML
1000 INJECTION, SOLUTION INTRAVENOUS
Qty: 0 | Refills: 0 | Status: DISCONTINUED | OUTPATIENT
Start: 2017-01-28 | End: 2017-02-17

## 2017-01-28 RX ORDER — IPRATROPIUM/ALBUTEROL SULFATE 18-103MCG
3 AEROSOL WITH ADAPTER (GRAM) INHALATION EVERY 6 HOURS
Qty: 0 | Refills: 0 | Status: DISCONTINUED | OUTPATIENT
Start: 2017-01-28 | End: 2017-01-28

## 2017-01-28 RX ORDER — SODIUM POLYSTYRENE SULFONATE 4.1 MEQ/G
30 POWDER, FOR SUSPENSION ORAL ONCE
Qty: 0 | Refills: 0 | Status: COMPLETED | OUTPATIENT
Start: 2017-01-28 | End: 2017-01-28

## 2017-01-28 RX ORDER — PANTOPRAZOLE SODIUM 20 MG/1
40 TABLET, DELAYED RELEASE ORAL DAILY
Qty: 0 | Refills: 0 | Status: DISCONTINUED | OUTPATIENT
Start: 2017-01-30 | End: 2017-02-06

## 2017-01-28 RX ORDER — ALBUTEROL 90 UG/1
1 AEROSOL, METERED ORAL EVERY 4 HOURS
Qty: 0 | Refills: 0 | Status: COMPLETED | OUTPATIENT
Start: 2017-01-28 | End: 2017-12-27

## 2017-01-28 RX ORDER — DEXTROSE 50 % IN WATER 50 %
25 SYRINGE (ML) INTRAVENOUS ONCE
Qty: 0 | Refills: 0 | Status: DISCONTINUED | OUTPATIENT
Start: 2017-01-28 | End: 2017-02-17

## 2017-01-28 RX ORDER — ALBUTEROL 90 UG/1
1 AEROSOL, METERED ORAL EVERY 4 HOURS
Qty: 0 | Refills: 0 | Status: DISCONTINUED | OUTPATIENT
Start: 2017-01-28 | End: 2017-01-29

## 2017-01-28 RX ORDER — DEXTROSE 50 % IN WATER 50 %
12.5 SYRINGE (ML) INTRAVENOUS ONCE
Qty: 0 | Refills: 0 | Status: DISCONTINUED | OUTPATIENT
Start: 2017-01-28 | End: 2017-02-17

## 2017-01-28 RX ORDER — VANCOMYCIN HCL 1 G
1250 VIAL (EA) INTRAVENOUS ONCE
Qty: 0 | Refills: 0 | Status: COMPLETED | OUTPATIENT
Start: 2017-01-28 | End: 2017-01-28

## 2017-01-28 RX ORDER — PIPERACILLIN AND TAZOBACTAM 4; .5 G/20ML; G/20ML
3.38 INJECTION, POWDER, LYOPHILIZED, FOR SOLUTION INTRAVENOUS EVERY 12 HOURS
Qty: 0 | Refills: 0 | Status: DISCONTINUED | OUTPATIENT
Start: 2017-01-28 | End: 2017-02-01

## 2017-01-28 RX ORDER — SODIUM BICARBONATE 1 MEQ/ML
0.14 SYRINGE (ML) INTRAVENOUS
Qty: 150 | Refills: 0 | Status: DISCONTINUED | OUTPATIENT
Start: 2017-01-28 | End: 2017-01-28

## 2017-01-28 RX ORDER — DEXTROSE 50 % IN WATER 50 %
50 SYRINGE (ML) INTRAVENOUS ONCE
Qty: 0 | Refills: 0 | Status: COMPLETED | OUTPATIENT
Start: 2017-01-28 | End: 2017-01-28

## 2017-01-28 RX ORDER — FENTANYL CITRATE 50 UG/ML
100 INJECTION INTRAVENOUS ONCE
Qty: 0 | Refills: 0 | Status: DISCONTINUED | OUTPATIENT
Start: 2017-01-28 | End: 2017-01-28

## 2017-01-28 RX ADMIN — Medication 50 MILLILITER(S): at 03:49

## 2017-01-28 RX ADMIN — Medication 75 MEQ/KG/HR: at 03:11

## 2017-01-28 RX ADMIN — MIDAZOLAM HYDROCHLORIDE 4 MILLIGRAM(S): 1 INJECTION, SOLUTION INTRAMUSCULAR; INTRAVENOUS at 19:00

## 2017-01-28 RX ADMIN — SODIUM POLYSTYRENE SULFONATE 30 GRAM(S): 4.1 POWDER, FOR SUSPENSION ORAL at 15:58

## 2017-01-28 RX ADMIN — HEPARIN SODIUM 5000 UNIT(S): 5000 INJECTION INTRAVENOUS; SUBCUTANEOUS at 13:00

## 2017-01-28 RX ADMIN — PIPERACILLIN AND TAZOBACTAM 25 GRAM(S): 4; .5 INJECTION, POWDER, LYOPHILIZED, FOR SOLUTION INTRAVENOUS at 05:47

## 2017-01-28 RX ADMIN — Medication 15.15 MICROGRAM(S)/KG/MIN: at 22:52

## 2017-01-28 RX ADMIN — AZITHROMYCIN 250 MILLIGRAM(S): 500 TABLET, FILM COATED ORAL at 03:11

## 2017-01-28 RX ADMIN — INSULIN HUMAN 10 UNIT(S): 100 INJECTION, SOLUTION SUBCUTANEOUS at 03:49

## 2017-01-28 RX ADMIN — Medication 3 MILLILITER(S): at 16:00

## 2017-01-28 RX ADMIN — HEPARIN SODIUM 5000 UNIT(S): 5000 INJECTION INTRAVENOUS; SUBCUTANEOUS at 21:32

## 2017-01-28 RX ADMIN — Medication 1 PUFF(S): at 22:21

## 2017-01-28 RX ADMIN — SODIUM CHLORIDE 75 MILLILITER(S): 9 INJECTION INTRAMUSCULAR; INTRAVENOUS; SUBCUTANEOUS at 09:48

## 2017-01-28 RX ADMIN — Medication 3 MILLILITER(S): at 10:48

## 2017-01-28 RX ADMIN — ALBUTEROL 1 PUFF(S): 90 AEROSOL, METERED ORAL at 22:18

## 2017-01-28 RX ADMIN — PIPERACILLIN AND TAZOBACTAM 25 GRAM(S): 4; .5 INJECTION, POWDER, LYOPHILIZED, FOR SOLUTION INTRAVENOUS at 17:57

## 2017-01-28 RX ADMIN — MIDAZOLAM HYDROCHLORIDE 4 MILLIGRAM(S): 1 INJECTION, SOLUTION INTRAMUSCULAR; INTRAVENOUS at 19:22

## 2017-01-28 RX ADMIN — MIDAZOLAM HYDROCHLORIDE 4 MILLIGRAM(S): 1 INJECTION, SOLUTION INTRAMUSCULAR; INTRAVENOUS at 18:39

## 2017-01-28 RX ADMIN — Medication 2: at 23:48

## 2017-01-28 RX ADMIN — PROPOFOL 19.39 MICROGRAM(S)/KG/MIN: 10 INJECTION, EMULSION INTRAVENOUS at 22:52

## 2017-01-28 RX ADMIN — FENTANYL CITRATE 100 MICROGRAM(S): 50 INJECTION INTRAVENOUS at 20:08

## 2017-01-28 RX ADMIN — SODIUM CHLORIDE 70 MILLILITER(S): 9 INJECTION INTRAMUSCULAR; INTRAVENOUS; SUBCUTANEOUS at 00:23

## 2017-01-28 RX ADMIN — HEPARIN SODIUM 5000 UNIT(S): 5000 INJECTION INTRAVENOUS; SUBCUTANEOUS at 05:47

## 2017-01-28 NOTE — H&P ADULT. - PROBLEM SELECTOR PLAN 5
-most likely hypovolemic hyponatremia  -will trend Na with IVF, if not downtrending, will send urine osm, serum os, urine Na

## 2017-01-28 NOTE — H&P ADULT. - HISTORY OF PRESENT ILLNESS
89 y/o F hx T2DM, HTN, HLD. Pt one week ago was having productive cough, and she went to outpat PCP, where she was diagnosed bronchitisPt was dx with  recent DX of bronchitis 1 week ago p/w 3 days of AMS, worsening SOB.  PT Tx for bronchitis by PCP one week ago on course of Azithromycin.  Pt acutely worsened 3 days ago with exertional dyspnea, productive cough and worsening mental status.  Today PT acutely SOB altered family called EMS. Pt arrives altered in respiratory distress unable to complete ROS today. 89 y/o F hx T2DM, HTN, HLD. Pt one week ago was having productive cough, and she went to outpat PCP, where she was diagnosed bronchitis and given azithromycin. Pt did not improve with azithromycin and for the past 3 days, acutely worse with SOB, and productive yellow sputum. Pt also was more confused and lethargic per daughter. Pt yesterday was climbing the stairs, and had to sit down on the stairs because she was so weak, and could not get back up. Pt was bought to ED. Per daughter, pt had no fevers, chills, CP, abdominal pain, n/v/d, dysuria, syncope.     In ED, vitals were 98.2, 86, 104/49, 29, 95 NRB. Pt had increase WOB, desat, and was placed on BiPAP.   Labs significant for severe leukocytosis, N predominance, elevated bands, hyponatremia, elevated K to 6s (moderately hemolyzed), BENJI, mild hyponatremia, elevated CK, CKMB, but normal troponin T, pt acidemic with lactate of 5. UA negative for UTI.   CXR showed Dense left basilar and retrocardiac opacification with obscured left hemidiaphragm and CP angle could be due to combination of pleural effusion and underlying airspace consolidation including pneumonia in the proper clinical context. Vague ill-defined increased right basilar markings could reflect additional area of mild consolidation as well versus subsegmental atelectatic change.  EKG showed sinus tachy 97  Pt given vanc, zosyn, 1 L bolus

## 2017-01-28 NOTE — H&P ADULT. - PROBLEM SELECTOR PLAN 4
-pt severely acidodic with metabolic and respiratory acidosis  -will start bicarb gtt  -if pt still acidodic, will consult renal in am -pt severely acidodic with metabolic and respiratory acidosis  -delta delta 1, no metabolic alkalosis   -will start bicarb gtt  -if pt still acidodic, will consult renal in am -pt severely acidodic with metabolic and respiratory acidosis  -delta delta 1, no metabolic alkalosis   -will start bicarb gtt  -if pt still acidodic, will consult renal in am    Update: bicarb improved to 17, per MICU team, d/c bicarb gtt

## 2017-01-28 NOTE — H&P ADULT. - PROBLEM SELECTOR PLAN 1
-pt has leukocytosis, tachypnea, in setting of PNA with severe BENJI, pt meets severe sepsis criteria  -will cover broadly with vanc/zosyn, azithromycin, given pt critically ill  -f/u blood cx, RVP, urine legionella  -IVF for insensible losses  -trend lactate

## 2017-01-28 NOTE — H&P ADULT. - PROBLEM SELECTOR PLAN 2
-pt has hypoxic respiratory failure 2/2 PNA  -pt also hypercapneic given not properly compensated by ge formula  -MICU consulted, currently pt stable on BiPAP, but with FIO2 of 100%  -continuos pulse ox  -will deescalate O2 supplement as tolerated  -repeat ABG

## 2017-01-28 NOTE — H&P ADULT. - PROBLEM SELECTOR PLAN 6
-CK, CKMB elevated, but troponin T normal, EKG normal  -will trend CE -CK, CKMB elevated, but troponin T normal, EKG normal  -will trend CE    Update:     CK, CKMB elevated. No EKG changes on first EKG. Bedside US did not show any cardiac strain. No current CP. Will repeat EKG and reassess. -CK, CKMB elevated, but troponin T normal, EKG normal  -will trend CE    Update:     CK, CKMB elevated. No EKG changes on first EKG. Bedside US done by MICU team did not show any cardiac strain. No current CP. Will repeat EKG and reassess. per MICU team, no need for telemetry at this time. -CK, CKMB elevated, but troponin T normal, EKG normal  -will trend CE    Update:     CK, CKMB elevated. No EKG changes on first EKG. 2nd EKG showed no changes, NSR 84. Bedside US done by MICU team did not show any cardiac strain. No current CP. Per MICU team, no need for telemetry at this time.

## 2017-01-29 LAB
ALBUMIN SERPL ELPH-MCNC: 2 G/DL — LOW (ref 3.3–5)
ALP SERPL-CCNC: 80 U/L — SIGNIFICANT CHANGE UP (ref 40–120)
ALT FLD-CCNC: 23 U/L — SIGNIFICANT CHANGE UP (ref 4–33)
AST SERPL-CCNC: 42 U/L — HIGH (ref 4–32)
BASE EXCESS BLDA CALC-SCNC: -10.4 MMOL/L — SIGNIFICANT CHANGE UP
BASOPHILS # BLD AUTO: 0.05 K/UL — SIGNIFICANT CHANGE UP (ref 0–0.2)
BASOPHILS NFR BLD AUTO: 0.1 % — SIGNIFICANT CHANGE UP (ref 0–2)
BILIRUB SERPL-MCNC: 0.6 MG/DL — SIGNIFICANT CHANGE UP (ref 0.2–1.2)
BUN SERPL-MCNC: 96 MG/DL — HIGH (ref 7–23)
CALCIUM SERPL-MCNC: 8.3 MG/DL — LOW (ref 8.4–10.5)
CHLORIDE BLDA-SCNC: 105 MMOL/L — SIGNIFICANT CHANGE UP (ref 96–108)
CHLORIDE SERPL-SCNC: 95 MMOL/L — LOW (ref 98–107)
CHLORIDE UR-SCNC: < 20 MMOL/L — SIGNIFICANT CHANGE UP
CK SERPL-CCNC: 648 U/L — HIGH (ref 25–170)
CO2 SERPL-SCNC: 16 MMOL/L — LOW (ref 22–31)
CREAT ?TM UR-MCNC: 140.61 MG/DL — SIGNIFICANT CHANGE UP
CREAT BLDA-MCNC: 5.31 MG/DL — HIGH (ref 0.5–1.3)
CREAT SERPL-MCNC: 4.65 MG/DL — HIGH (ref 0.5–1.3)
EOSINOPHIL # BLD AUTO: 0 K/UL — SIGNIFICANT CHANGE UP (ref 0–0.5)
EOSINOPHIL NFR BLD AUTO: 0 % — SIGNIFICANT CHANGE UP (ref 0–6)
GLUCOSE BLDA-MCNC: 259 MG/DL — HIGH (ref 70–99)
GLUCOSE SERPL-MCNC: 272 MG/DL — HIGH (ref 70–99)
HBV SURFACE AG SER-ACNC: NEGATIVE — SIGNIFICANT CHANGE UP
HCO3 BLDA-SCNC: 16 MMOL/L — LOW (ref 22–26)
HCT VFR BLD CALC: 29.2 % — LOW (ref 34.5–45)
HCT VFR BLDA CALC: 27.4 % — LOW (ref 34.5–46.5)
HGB BLD-MCNC: 9.5 G/DL — LOW (ref 11.5–15.5)
HGB BLDA-MCNC: 8.8 G/DL — LOW (ref 11.5–15.5)
IMM GRANULOCYTES NFR BLD AUTO: 0.8 % — SIGNIFICANT CHANGE UP (ref 0–1.5)
L PNEUMO AG UR QL: NEGATIVE — SIGNIFICANT CHANGE UP
LACTATE BLDA-SCNC: 1.3 MMOL/L — SIGNIFICANT CHANGE UP (ref 0.5–2)
LACTATE SERPL-SCNC: 1.6 MMOL/L — SIGNIFICANT CHANGE UP (ref 0.5–2)
LYMPHOCYTES # BLD AUTO: 1.75 K/UL — SIGNIFICANT CHANGE UP (ref 1–3.3)
LYMPHOCYTES # BLD AUTO: 4.6 % — LOW (ref 13–44)
MAGNESIUM SERPL-MCNC: 2.1 MG/DL — SIGNIFICANT CHANGE UP (ref 1.6–2.6)
MANUAL SMEAR VERIFICATION: SIGNIFICANT CHANGE UP
MCHC RBC-ENTMCNC: 28.5 PG — SIGNIFICANT CHANGE UP (ref 27–34)
MCHC RBC-ENTMCNC: 32.5 % — SIGNIFICANT CHANGE UP (ref 32–36)
MCV RBC AUTO: 87.7 FL — SIGNIFICANT CHANGE UP (ref 80–100)
MONOCYTES # BLD AUTO: 0.75 K/UL — SIGNIFICANT CHANGE UP (ref 0–0.9)
MONOCYTES NFR BLD AUTO: 2 % — SIGNIFICANT CHANGE UP (ref 2–14)
NEUTROPHILS # BLD AUTO: 35.57 K/UL — HIGH (ref 1.8–7.4)
NEUTROPHILS NFR BLD AUTO: 92.5 % — HIGH (ref 43–77)
PCO2 BLDA: 35 MMHG — SIGNIFICANT CHANGE UP (ref 32–48)
PH BLDA: 7.26 PH — LOW (ref 7.35–7.45)
PHOSPHATE SERPL-MCNC: 5.9 MG/DL — HIGH (ref 2.5–4.5)
PLATELET # BLD AUTO: 79 K/UL — LOW (ref 150–400)
PMV BLD: 11.9 FL — SIGNIFICANT CHANGE UP (ref 7–13)
PO2 BLDA: 92 MMHG — SIGNIFICANT CHANGE UP (ref 83–108)
POTASSIUM BLDA-SCNC: 4.9 MMOL/L — HIGH (ref 3.4–4.5)
POTASSIUM SERPL-MCNC: 5.4 MMOL/L — HIGH (ref 3.5–5.3)
POTASSIUM SERPL-SCNC: 5.4 MMOL/L — HIGH (ref 3.5–5.3)
POTASSIUM UR-SCNC: 56.8 MEQ/L — SIGNIFICANT CHANGE UP
PROT SERPL-MCNC: 5.8 G/DL — LOW (ref 6–8.3)
RBC # BLD: 3.33 M/UL — LOW (ref 3.8–5.2)
RBC # FLD: 14.5 % — SIGNIFICANT CHANGE UP (ref 10.3–14.5)
SAO2 % BLDA: 96.7 % — SIGNIFICANT CHANGE UP (ref 95–99)
SODIUM BLDA-SCNC: 130 MMOL/L — LOW (ref 136–146)
SODIUM SERPL-SCNC: 134 MMOL/L — LOW (ref 135–145)
SODIUM UR-SCNC: 22 MEQ/L — SIGNIFICANT CHANGE UP
VANCOMYCIN TROUGH SERPL-MCNC: 19.8 UG/ML — SIGNIFICANT CHANGE UP (ref 10–20)
WBC # BLD: 38.44 K/UL — HIGH (ref 3.8–10.5)
WBC # FLD AUTO: 38.44 K/UL — HIGH (ref 3.8–10.5)

## 2017-01-29 PROCEDURE — 36556 INSERT NON-TUNNEL CV CATH: CPT

## 2017-01-29 PROCEDURE — 99233 SBSQ HOSP IP/OBS HIGH 50: CPT | Mod: GC

## 2017-01-29 PROCEDURE — 71010: CPT | Mod: 26

## 2017-01-29 RX ORDER — SODIUM CHLORIDE 9 MG/ML
1000 INJECTION INTRAMUSCULAR; INTRAVENOUS; SUBCUTANEOUS ONCE
Qty: 0 | Refills: 0 | Status: COMPLETED | OUTPATIENT
Start: 2017-01-29 | End: 2017-01-29

## 2017-01-29 RX ORDER — DESMOPRESSIN ACETATE 0.1 MG/1
25 TABLET ORAL ONCE
Qty: 0 | Refills: 0 | Status: DISCONTINUED | OUTPATIENT
Start: 2017-01-29 | End: 2017-01-29

## 2017-01-29 RX ORDER — ALBUTEROL 90 UG/1
1 AEROSOL, METERED ORAL EVERY 6 HOURS
Qty: 0 | Refills: 0 | Status: DISCONTINUED | OUTPATIENT
Start: 2017-01-29 | End: 2017-02-06

## 2017-01-29 RX ORDER — DESMOPRESSIN ACETATE 0.1 MG/1
25 TABLET ORAL ONCE
Qty: 0 | Refills: 0 | Status: COMPLETED | OUTPATIENT
Start: 2017-01-29 | End: 2017-01-29

## 2017-01-29 RX ORDER — INSULIN GLARGINE 100 [IU]/ML
5 INJECTION, SOLUTION SUBCUTANEOUS AT BEDTIME
Qty: 0 | Refills: 0 | Status: DISCONTINUED | OUTPATIENT
Start: 2017-01-29 | End: 2017-02-01

## 2017-01-29 RX ADMIN — HEPARIN SODIUM 5000 UNIT(S): 5000 INJECTION INTRAVENOUS; SUBCUTANEOUS at 14:54

## 2017-01-29 RX ADMIN — PIPERACILLIN AND TAZOBACTAM 25 GRAM(S): 4; .5 INJECTION, POWDER, LYOPHILIZED, FOR SOLUTION INTRAVENOUS at 05:55

## 2017-01-29 RX ADMIN — SODIUM CHLORIDE 2000 MILLILITER(S): 9 INJECTION INTRAMUSCULAR; INTRAVENOUS; SUBCUTANEOUS at 06:15

## 2017-01-29 RX ADMIN — PROPOFOL 19.39 MICROGRAM(S)/KG/MIN: 10 INJECTION, EMULSION INTRAVENOUS at 07:49

## 2017-01-29 RX ADMIN — DESMOPRESSIN ACETATE 225 MICROGRAM(S): 0.1 TABLET ORAL at 08:30

## 2017-01-29 RX ADMIN — Medication 15.15 MICROGRAM(S)/KG/MIN: at 23:16

## 2017-01-29 RX ADMIN — PIPERACILLIN AND TAZOBACTAM 25 GRAM(S): 4; .5 INJECTION, POWDER, LYOPHILIZED, FOR SOLUTION INTRAVENOUS at 18:16

## 2017-01-29 RX ADMIN — SODIUM CHLORIDE 1500 MILLILITER(S): 9 INJECTION INTRAMUSCULAR; INTRAVENOUS; SUBCUTANEOUS at 05:02

## 2017-01-29 RX ADMIN — HEPARIN SODIUM 5000 UNIT(S): 5000 INJECTION INTRAVENOUS; SUBCUTANEOUS at 23:15

## 2017-01-29 RX ADMIN — Medication 2: at 12:04

## 2017-01-29 RX ADMIN — ALBUTEROL 1 PUFF(S): 90 AEROSOL, METERED ORAL at 03:40

## 2017-01-29 RX ADMIN — Medication: at 06:55

## 2017-01-29 RX ADMIN — Medication 1 PUFF(S): at 11:42

## 2017-01-29 RX ADMIN — Medication 1 PUFF(S): at 22:04

## 2017-01-29 RX ADMIN — Medication 166.67 MILLIGRAM(S): at 00:14

## 2017-01-29 RX ADMIN — Medication 1 PUFF(S): at 15:55

## 2017-01-29 RX ADMIN — AZITHROMYCIN 250 MILLIGRAM(S): 500 TABLET, FILM COATED ORAL at 02:22

## 2017-01-29 RX ADMIN — ALBUTEROL 1 PUFF(S): 90 AEROSOL, METERED ORAL at 11:41

## 2017-01-29 RX ADMIN — Medication 1 PUFF(S): at 03:40

## 2017-01-29 RX ADMIN — ALBUTEROL 1 PUFF(S): 90 AEROSOL, METERED ORAL at 15:54

## 2017-01-29 RX ADMIN — Medication 15.15 MICROGRAM(S)/KG/MIN: at 07:49

## 2017-01-29 RX ADMIN — PROPOFOL 19.39 MICROGRAM(S)/KG/MIN: 10 INJECTION, EMULSION INTRAVENOUS at 23:16

## 2017-01-29 RX ADMIN — HEPARIN SODIUM 5000 UNIT(S): 5000 INJECTION INTRAVENOUS; SUBCUTANEOUS at 05:55

## 2017-01-29 RX ADMIN — ALBUTEROL 1 PUFF(S): 90 AEROSOL, METERED ORAL at 22:04

## 2017-01-29 RX ADMIN — Medication 1: at 18:22

## 2017-01-29 RX ADMIN — INSULIN GLARGINE 5 UNIT(S): 100 INJECTION, SOLUTION SUBCUTANEOUS at 23:15

## 2017-01-30 LAB
ALBUMIN SERPL ELPH-MCNC: 1.7 G/DL — LOW (ref 3.3–5)
ALP SERPL-CCNC: 70 U/L — SIGNIFICANT CHANGE UP (ref 40–120)
ALT FLD-CCNC: 15 U/L — SIGNIFICANT CHANGE UP (ref 4–33)
AST SERPL-CCNC: 27 U/L — SIGNIFICANT CHANGE UP (ref 4–32)
BASE EXCESS BLDA CALC-SCNC: -3.4 MMOL/L — SIGNIFICANT CHANGE UP
BASOPHILS # BLD AUTO: 0.02 K/UL — SIGNIFICANT CHANGE UP (ref 0–0.2)
BASOPHILS NFR BLD AUTO: 0.1 % — SIGNIFICANT CHANGE UP (ref 0–2)
BILIRUB SERPL-MCNC: 0.8 MG/DL — SIGNIFICANT CHANGE UP (ref 0.2–1.2)
BUN SERPL-MCNC: 38 MG/DL — HIGH (ref 7–23)
BUN SERPL-MCNC: 61 MG/DL — HIGH (ref 7–23)
BUN SERPL-MCNC: 64 MG/DL — HIGH (ref 7–23)
CALCIUM SERPL-MCNC: 7.5 MG/DL — LOW (ref 8.4–10.5)
CALCIUM SERPL-MCNC: 7.8 MG/DL — LOW (ref 8.4–10.5)
CALCIUM SERPL-MCNC: 7.9 MG/DL — LOW (ref 8.4–10.5)
CHLORIDE BLDA-SCNC: 105 MMOL/L — SIGNIFICANT CHANGE UP (ref 96–108)
CHLORIDE SERPL-SCNC: 92 MMOL/L — LOW (ref 98–107)
CHLORIDE SERPL-SCNC: 99 MMOL/L — SIGNIFICANT CHANGE UP (ref 98–107)
CHLORIDE SERPL-SCNC: 99 MMOL/L — SIGNIFICANT CHANGE UP (ref 98–107)
CO2 SERPL-SCNC: 19 MMOL/L — LOW (ref 22–31)
CO2 SERPL-SCNC: 19 MMOL/L — LOW (ref 22–31)
CO2 SERPL-SCNC: 22 MMOL/L — SIGNIFICANT CHANGE UP (ref 22–31)
CREAT BLDA-MCNC: 3.76 MG/DL — HIGH (ref 0.5–1.3)
CREAT SERPL-MCNC: 2.01 MG/DL — HIGH (ref 0.5–1.3)
CREAT SERPL-MCNC: 3.18 MG/DL — HIGH (ref 0.5–1.3)
CREAT SERPL-MCNC: 3.34 MG/DL — HIGH (ref 0.5–1.3)
EOSINOPHIL # BLD AUTO: 0 K/UL — SIGNIFICANT CHANGE UP (ref 0–0.5)
EOSINOPHIL NFR BLD AUTO: 0 % — SIGNIFICANT CHANGE UP (ref 0–6)
GLUCOSE BLDA-MCNC: 115 MG/DL — HIGH (ref 70–99)
GLUCOSE SERPL-MCNC: 119 MG/DL — HIGH (ref 70–99)
GLUCOSE SERPL-MCNC: 139 MG/DL — HIGH (ref 70–99)
GLUCOSE SERPL-MCNC: 195 MG/DL — HIGH (ref 70–99)
GRAM STN SPT: SIGNIFICANT CHANGE UP
GRAM STN SPT: SIGNIFICANT CHANGE UP
HBV CORE IGM SER-ACNC: NONREACTIVE — SIGNIFICANT CHANGE UP
HBV SURFACE AB SER-ACNC: <3 MLU/ML — LOW
HCO3 BLDA-SCNC: 22 MMOL/L — SIGNIFICANT CHANGE UP (ref 22–26)
HCT VFR BLD CALC: 24.5 % — LOW (ref 34.5–45)
HCT VFR BLD CALC: 25.1 % — LOW (ref 34.5–45)
HCT VFR BLDA CALC: 22.2 % — LOW (ref 34.5–46.5)
HCV AB S/CO SERPL IA: 12.95 S/CO — SIGNIFICANT CHANGE UP
HCV AB SERPL-IMP: REACTIVE — SIGNIFICANT CHANGE UP
HGB BLD-MCNC: 8.1 G/DL — LOW (ref 11.5–15.5)
HGB BLD-MCNC: 8.2 G/DL — LOW (ref 11.5–15.5)
HGB BLDA-MCNC: 7.1 G/DL — LOW (ref 11.5–15.5)
IMM GRANULOCYTES NFR BLD AUTO: 1.8 % — HIGH (ref 0–1.5)
LACTATE BLDA-SCNC: 1.2 MMOL/L — SIGNIFICANT CHANGE UP (ref 0.5–2)
LYMPHOCYTES # BLD AUTO: 1.27 K/UL — SIGNIFICANT CHANGE UP (ref 1–3.3)
LYMPHOCYTES # BLD AUTO: 4.9 % — LOW (ref 13–44)
MAGNESIUM SERPL-MCNC: 1.9 MG/DL — SIGNIFICANT CHANGE UP (ref 1.6–2.6)
MAGNESIUM SERPL-MCNC: 2.1 MG/DL — SIGNIFICANT CHANGE UP (ref 1.6–2.6)
MCHC RBC-ENTMCNC: 27.7 PG — SIGNIFICANT CHANGE UP (ref 27–34)
MCHC RBC-ENTMCNC: 27.9 PG — SIGNIFICANT CHANGE UP (ref 27–34)
MCHC RBC-ENTMCNC: 32.7 % — SIGNIFICANT CHANGE UP (ref 32–36)
MCHC RBC-ENTMCNC: 33.1 % — SIGNIFICANT CHANGE UP (ref 32–36)
MCV RBC AUTO: 84.5 FL — SIGNIFICANT CHANGE UP (ref 80–100)
MCV RBC AUTO: 84.8 FL — SIGNIFICANT CHANGE UP (ref 80–100)
MONOCYTES # BLD AUTO: 0.52 K/UL — SIGNIFICANT CHANGE UP (ref 0–0.9)
MONOCYTES NFR BLD AUTO: 2 % — SIGNIFICANT CHANGE UP (ref 2–14)
NEUTROPHILS # BLD AUTO: 23.49 K/UL — HIGH (ref 1.8–7.4)
NEUTROPHILS NFR BLD AUTO: 91.2 % — HIGH (ref 43–77)
PCO2 BLDA: 37 MMHG — SIGNIFICANT CHANGE UP (ref 32–48)
PH BLDA: 7.38 PH — SIGNIFICANT CHANGE UP (ref 7.35–7.45)
PHOSPHATE SERPL-MCNC: 4.8 MG/DL — HIGH (ref 2.5–4.5)
PHOSPHATE SERPL-MCNC: 4.8 MG/DL — HIGH (ref 2.5–4.5)
PLATELET # BLD AUTO: 22 K/UL — LOW (ref 150–400)
PLATELET # BLD AUTO: 39 K/UL — LOW (ref 150–400)
PMV BLD: 11.1 FL — SIGNIFICANT CHANGE UP (ref 7–13)
PMV BLD: 11.6 FL — SIGNIFICANT CHANGE UP (ref 7–13)
PO2 BLDA: 106 MMHG — SIGNIFICANT CHANGE UP (ref 83–108)
POTASSIUM BLDA-SCNC: 3.8 MMOL/L — SIGNIFICANT CHANGE UP (ref 3.4–4.5)
POTASSIUM SERPL-MCNC: 4.2 MMOL/L — SIGNIFICANT CHANGE UP (ref 3.5–5.3)
POTASSIUM SERPL-MCNC: 4.2 MMOL/L — SIGNIFICANT CHANGE UP (ref 3.5–5.3)
POTASSIUM SERPL-MCNC: SIGNIFICANT CHANGE UP MMOL/L (ref 3.5–5.3)
POTASSIUM SERPL-SCNC: 4.2 MMOL/L — SIGNIFICANT CHANGE UP (ref 3.5–5.3)
POTASSIUM SERPL-SCNC: 4.2 MMOL/L — SIGNIFICANT CHANGE UP (ref 3.5–5.3)
POTASSIUM SERPL-SCNC: SIGNIFICANT CHANGE UP MMOL/L (ref 3.5–5.3)
PROT SERPL-MCNC: 5 G/DL — LOW (ref 6–8.3)
RBC # BLD: 2.9 M/UL — LOW (ref 3.8–5.2)
RBC # BLD: 2.96 M/UL — LOW (ref 3.8–5.2)
RBC # FLD: 14.1 % — SIGNIFICANT CHANGE UP (ref 10.3–14.5)
RBC # FLD: 14.2 % — SIGNIFICANT CHANGE UP (ref 10.3–14.5)
SAO2 % BLDA: 97.8 % — SIGNIFICANT CHANGE UP (ref 95–99)
SODIUM BLDA-SCNC: 132 MMOL/L — LOW (ref 136–146)
SODIUM SERPL-SCNC: 129 MMOL/L — LOW (ref 135–145)
SODIUM SERPL-SCNC: 133 MMOL/L — LOW (ref 135–145)
SODIUM SERPL-SCNC: 134 MMOL/L — LOW (ref 135–145)
SPECIMEN SOURCE: SIGNIFICANT CHANGE UP
SPECIMEN SOURCE: SIGNIFICANT CHANGE UP
VANCOMYCIN FLD-MCNC: 30 UG/ML — CRITICAL HIGH
WBC # BLD: 25.75 K/UL — HIGH (ref 3.8–10.5)
WBC # BLD: 25.77 K/UL — HIGH (ref 3.8–10.5)
WBC # FLD AUTO: 25.75 K/UL — HIGH (ref 3.8–10.5)
WBC # FLD AUTO: 25.77 K/UL — HIGH (ref 3.8–10.5)

## 2017-01-30 PROCEDURE — 99291 CRITICAL CARE FIRST HOUR: CPT

## 2017-01-30 RX ORDER — VANCOMYCIN HCL 1 G
1000 VIAL (EA) INTRAVENOUS ONCE
Qty: 0 | Refills: 0 | Status: COMPLETED | OUTPATIENT
Start: 2017-01-30 | End: 2017-01-30

## 2017-01-30 RX ADMIN — PIPERACILLIN AND TAZOBACTAM 25 GRAM(S): 4; .5 INJECTION, POWDER, LYOPHILIZED, FOR SOLUTION INTRAVENOUS at 05:32

## 2017-01-30 RX ADMIN — PIPERACILLIN AND TAZOBACTAM 25 GRAM(S): 4; .5 INJECTION, POWDER, LYOPHILIZED, FOR SOLUTION INTRAVENOUS at 20:00

## 2017-01-30 RX ADMIN — Medication 1 PUFF(S): at 23:19

## 2017-01-30 RX ADMIN — HEPARIN SODIUM 5000 UNIT(S): 5000 INJECTION INTRAVENOUS; SUBCUTANEOUS at 13:21

## 2017-01-30 RX ADMIN — Medication 1 PUFF(S): at 03:35

## 2017-01-30 RX ADMIN — ALBUTEROL 1 PUFF(S): 90 AEROSOL, METERED ORAL at 17:02

## 2017-01-30 RX ADMIN — Medication 15.15 MICROGRAM(S)/KG/MIN: at 13:20

## 2017-01-30 RX ADMIN — PROPOFOL 19.39 MICROGRAM(S)/KG/MIN: 10 INJECTION, EMULSION INTRAVENOUS at 13:20

## 2017-01-30 RX ADMIN — ALBUTEROL 1 PUFF(S): 90 AEROSOL, METERED ORAL at 23:18

## 2017-01-30 RX ADMIN — INSULIN GLARGINE 5 UNIT(S): 100 INJECTION, SOLUTION SUBCUTANEOUS at 23:07

## 2017-01-30 RX ADMIN — ALBUTEROL 1 PUFF(S): 90 AEROSOL, METERED ORAL at 09:08

## 2017-01-30 RX ADMIN — Medication 1: at 23:54

## 2017-01-30 RX ADMIN — PANTOPRAZOLE SODIUM 40 MILLIGRAM(S): 20 TABLET, DELAYED RELEASE ORAL at 13:21

## 2017-01-30 RX ADMIN — Medication 1 PUFF(S): at 09:08

## 2017-01-30 RX ADMIN — Medication 250 MILLIGRAM(S): at 21:16

## 2017-01-30 RX ADMIN — Medication 15.15 MICROGRAM(S)/KG/MIN: at 21:16

## 2017-01-30 RX ADMIN — HEPARIN SODIUM 5000 UNIT(S): 5000 INJECTION INTRAVENOUS; SUBCUTANEOUS at 05:32

## 2017-01-30 RX ADMIN — ALBUTEROL 1 PUFF(S): 90 AEROSOL, METERED ORAL at 03:35

## 2017-01-30 RX ADMIN — Medication 1 PUFF(S): at 17:02

## 2017-01-30 RX ADMIN — HEPARIN SODIUM 5000 UNIT(S): 5000 INJECTION INTRAVENOUS; SUBCUTANEOUS at 21:15

## 2017-01-31 LAB
ALBUMIN SERPL ELPH-MCNC: 1.8 G/DL — LOW (ref 3.3–5)
ALP SERPL-CCNC: 90 U/L — SIGNIFICANT CHANGE UP (ref 40–120)
ALT FLD-CCNC: 14 U/L — SIGNIFICANT CHANGE UP (ref 4–33)
AST SERPL-CCNC: 25 U/L — SIGNIFICANT CHANGE UP (ref 4–32)
BASOPHILS # BLD AUTO: 0.02 K/UL — SIGNIFICANT CHANGE UP (ref 0–0.2)
BASOPHILS NFR BLD AUTO: 0.1 % — SIGNIFICANT CHANGE UP (ref 0–2)
BASOPHILS NFR SPEC: 0 % — SIGNIFICANT CHANGE UP (ref 0–2)
BILIRUB SERPL-MCNC: 1.2 MG/DL — SIGNIFICANT CHANGE UP (ref 0.2–1.2)
BUN SERPL-MCNC: 40 MG/DL — HIGH (ref 7–23)
CALCIUM SERPL-MCNC: 7.9 MG/DL — LOW (ref 8.4–10.5)
CHLORIDE SERPL-SCNC: 98 MMOL/L — SIGNIFICANT CHANGE UP (ref 98–107)
CO2 SERPL-SCNC: 24 MMOL/L — SIGNIFICANT CHANGE UP (ref 22–31)
CREAT SERPL-MCNC: 2.28 MG/DL — HIGH (ref 0.5–1.3)
EOSINOPHIL # BLD AUTO: 0 K/UL — SIGNIFICANT CHANGE UP (ref 0–0.5)
EOSINOPHIL NFR BLD AUTO: 0 % — SIGNIFICANT CHANGE UP (ref 0–6)
EOSINOPHIL NFR FLD: 0 % — SIGNIFICANT CHANGE UP (ref 0–6)
GLUCOSE SERPL-MCNC: 140 MG/DL — HIGH (ref 70–99)
HCT VFR BLD CALC: 23.9 % — LOW (ref 34.5–45)
HGB BLD-MCNC: 7.8 G/DL — LOW (ref 11.5–15.5)
IMM GRANULOCYTES NFR BLD AUTO: 2 % — HIGH (ref 0–1.5)
LYMPHOCYTES # BLD AUTO: 1.03 K/UL — SIGNIFICANT CHANGE UP (ref 1–3.3)
LYMPHOCYTES # BLD AUTO: 5.6 % — LOW (ref 13–44)
LYMPHOCYTES NFR SPEC AUTO: 6 % — LOW (ref 13–44)
MAGNESIUM SERPL-MCNC: 1.9 MG/DL — SIGNIFICANT CHANGE UP (ref 1.6–2.6)
MANUAL SMEAR VERIFICATION: SIGNIFICANT CHANGE UP
MCHC RBC-ENTMCNC: 27.4 PG — SIGNIFICANT CHANGE UP (ref 27–34)
MCHC RBC-ENTMCNC: 32.6 % — SIGNIFICANT CHANGE UP (ref 32–36)
MCV RBC AUTO: 83.9 FL — SIGNIFICANT CHANGE UP (ref 80–100)
METAMYELOCYTES # FLD: 1 % — SIGNIFICANT CHANGE UP (ref 0–1)
MONOCYTES # BLD AUTO: 0.43 K/UL — SIGNIFICANT CHANGE UP (ref 0–0.9)
MONOCYTES NFR BLD AUTO: 2.3 % — SIGNIFICANT CHANGE UP (ref 2–14)
MONOCYTES NFR BLD: 0 % — LOW (ref 2–9)
MORPHOLOGY BLD-IMP: NORMAL — SIGNIFICANT CHANGE UP
MYELOCYTES NFR BLD: 1 % — HIGH (ref 0–0)
NEUTROPHIL AB SER-ACNC: 91 % — HIGH (ref 43–77)
NEUTROPHILS # BLD AUTO: 16.47 K/UL — HIGH (ref 1.8–7.4)
NEUTROPHILS NFR BLD AUTO: 90 % — HIGH (ref 43–77)
PHOSPHATE SERPL-MCNC: 3.6 MG/DL — SIGNIFICANT CHANGE UP (ref 2.5–4.5)
PLATELET # BLD AUTO: 19 K/UL — CRITICAL LOW (ref 150–400)
PMV BLD: 10.8 FL — SIGNIFICANT CHANGE UP (ref 7–13)
POTASSIUM SERPL-MCNC: 3.6 MMOL/L — SIGNIFICANT CHANGE UP (ref 3.5–5.3)
POTASSIUM SERPL-SCNC: 3.6 MMOL/L — SIGNIFICANT CHANGE UP (ref 3.5–5.3)
PROT SERPL-MCNC: 5.1 G/DL — LOW (ref 6–8.3)
RBC # BLD: 2.85 M/UL — LOW (ref 3.8–5.2)
RBC # FLD: 14.1 % — SIGNIFICANT CHANGE UP (ref 10.3–14.5)
SODIUM SERPL-SCNC: 136 MMOL/L — SIGNIFICANT CHANGE UP (ref 135–145)
VANCOMYCIN FLD-MCNC: 25.9 UG/ML — CRITICAL HIGH
VARIANT LYMPHS # BLD: 1 % — SIGNIFICANT CHANGE UP
WBC # BLD: 18.31 K/UL — HIGH (ref 3.8–10.5)
WBC # FLD AUTO: 18.31 K/UL — HIGH (ref 3.8–10.5)

## 2017-01-31 PROCEDURE — 99291 CRITICAL CARE FIRST HOUR: CPT

## 2017-01-31 RX ORDER — MIDODRINE HYDROCHLORIDE 2.5 MG/1
10 TABLET ORAL EVERY 8 HOURS
Qty: 0 | Refills: 0 | Status: DISCONTINUED | OUTPATIENT
Start: 2017-01-31 | End: 2017-02-01

## 2017-01-31 RX ADMIN — ALBUTEROL 1 PUFF(S): 90 AEROSOL, METERED ORAL at 21:40

## 2017-01-31 RX ADMIN — PIPERACILLIN AND TAZOBACTAM 25 GRAM(S): 4; .5 INJECTION, POWDER, LYOPHILIZED, FOR SOLUTION INTRAVENOUS at 18:13

## 2017-01-31 RX ADMIN — INSULIN GLARGINE 5 UNIT(S): 100 INJECTION, SOLUTION SUBCUTANEOUS at 22:28

## 2017-01-31 RX ADMIN — ALBUTEROL 1 PUFF(S): 90 AEROSOL, METERED ORAL at 15:57

## 2017-01-31 RX ADMIN — ALBUTEROL 1 PUFF(S): 90 AEROSOL, METERED ORAL at 04:24

## 2017-01-31 RX ADMIN — Medication 1 PUFF(S): at 15:57

## 2017-01-31 RX ADMIN — Medication 15.15 MICROGRAM(S)/KG/MIN: at 12:27

## 2017-01-31 RX ADMIN — PANTOPRAZOLE SODIUM 40 MILLIGRAM(S): 20 TABLET, DELAYED RELEASE ORAL at 12:26

## 2017-01-31 RX ADMIN — PIPERACILLIN AND TAZOBACTAM 25 GRAM(S): 4; .5 INJECTION, POWDER, LYOPHILIZED, FOR SOLUTION INTRAVENOUS at 05:02

## 2017-01-31 RX ADMIN — MIDODRINE HYDROCHLORIDE 10 MILLIGRAM(S): 2.5 TABLET ORAL at 12:27

## 2017-01-31 RX ADMIN — Medication 1 PUFF(S): at 21:40

## 2017-01-31 RX ADMIN — Medication 1 PUFF(S): at 10:53

## 2017-01-31 RX ADMIN — Medication 1 PUFF(S): at 04:24

## 2017-01-31 RX ADMIN — ALBUTEROL 1 PUFF(S): 90 AEROSOL, METERED ORAL at 10:53

## 2017-01-31 RX ADMIN — MIDODRINE HYDROCHLORIDE 10 MILLIGRAM(S): 2.5 TABLET ORAL at 22:28

## 2017-02-01 LAB
ALBUMIN SERPL ELPH-MCNC: 1.6 G/DL — LOW (ref 3.3–5)
ALP SERPL-CCNC: 140 U/L — HIGH (ref 40–120)
ALT FLD-CCNC: 25 U/L — SIGNIFICANT CHANGE UP (ref 4–33)
AST SERPL-CCNC: 76 U/L — HIGH (ref 4–32)
BACTERIA BLD CULT: SIGNIFICANT CHANGE UP
BACTERIA BLD CULT: SIGNIFICANT CHANGE UP
BASOPHILS # BLD AUTO: 0.02 K/UL — SIGNIFICANT CHANGE UP (ref 0–0.2)
BASOPHILS NFR BLD AUTO: 0.1 % — SIGNIFICANT CHANGE UP (ref 0–2)
BILIRUB SERPL-MCNC: 2 MG/DL — HIGH (ref 0.2–1.2)
BUN SERPL-MCNC: 64 MG/DL — HIGH (ref 7–23)
BUN SERPL-MCNC: 68 MG/DL — HIGH (ref 7–23)
CALCIUM SERPL-MCNC: 7.8 MG/DL — LOW (ref 8.4–10.5)
CALCIUM SERPL-MCNC: 7.9 MG/DL — LOW (ref 8.4–10.5)
CHLORIDE SERPL-SCNC: 95 MMOL/L — LOW (ref 98–107)
CHLORIDE SERPL-SCNC: 96 MMOL/L — LOW (ref 98–107)
CO2 SERPL-SCNC: 18 MMOL/L — LOW (ref 22–31)
CO2 SERPL-SCNC: 20 MMOL/L — LOW (ref 22–31)
CREAT SERPL-MCNC: 3.21 MG/DL — HIGH (ref 0.5–1.3)
CREAT SERPL-MCNC: 3.37 MG/DL — HIGH (ref 0.5–1.3)
EOSINOPHIL # BLD AUTO: 0.01 K/UL — SIGNIFICANT CHANGE UP (ref 0–0.5)
EOSINOPHIL NFR BLD AUTO: 0.1 % — SIGNIFICANT CHANGE UP (ref 0–6)
GLUCOSE SERPL-MCNC: 312 MG/DL — HIGH (ref 70–99)
GLUCOSE SERPL-MCNC: 400 MG/DL — HIGH (ref 70–99)
HCT VFR BLD CALC: 23.2 % — LOW (ref 34.5–45)
HCV RNA FLD QL NAA+PROBE: SIGNIFICANT CHANGE UP
HCV RNA SPEC QL PROBE+SIG AMP: DETECTED — SIGNIFICANT CHANGE UP
HEPARIN CF II AG PPP-ACNC: 0.04 — SIGNIFICANT CHANGE UP (ref 0–0.39)
HGB BLD-MCNC: 7.3 G/DL — LOW (ref 11.5–15.5)
IMM GRANULOCYTES NFR BLD AUTO: 3.5 % — HIGH (ref 0–1.5)
LYMPHOCYTES # BLD AUTO: 0.81 K/UL — LOW (ref 1–3.3)
LYMPHOCYTES # BLD AUTO: 5.2 % — LOW (ref 13–44)
MAGNESIUM SERPL-MCNC: 2.2 MG/DL — SIGNIFICANT CHANGE UP (ref 1.6–2.6)
MANUAL SMEAR VERIFICATION: SIGNIFICANT CHANGE UP
MCHC RBC-ENTMCNC: 27.2 PG — SIGNIFICANT CHANGE UP (ref 27–34)
MCHC RBC-ENTMCNC: 31.5 % — LOW (ref 32–36)
MCV RBC AUTO: 86.6 FL — SIGNIFICANT CHANGE UP (ref 80–100)
MONOCYTES # BLD AUTO: 0.5 K/UL — SIGNIFICANT CHANGE UP (ref 0–0.9)
MONOCYTES NFR BLD AUTO: 3.2 % — SIGNIFICANT CHANGE UP (ref 2–14)
NEUTROPHILS # BLD AUTO: 13.64 K/UL — HIGH (ref 1.8–7.4)
NEUTROPHILS NFR BLD AUTO: 87.9 % — HIGH (ref 43–77)
PHOSPHATE SERPL-MCNC: 5.7 MG/DL — HIGH (ref 2.5–4.5)
PLATELET # BLD AUTO: 49 K/UL — LOW (ref 150–400)
PMV BLD: SIGNIFICANT CHANGE UP FL (ref 7–13)
POTASSIUM SERPL-MCNC: 3.4 MMOL/L — LOW (ref 3.5–5.3)
POTASSIUM SERPL-MCNC: SIGNIFICANT CHANGE UP MMOL/L (ref 3.5–5.3)
POTASSIUM SERPL-SCNC: 3.4 MMOL/L — LOW (ref 3.5–5.3)
POTASSIUM SERPL-SCNC: SIGNIFICANT CHANGE UP MMOL/L (ref 3.5–5.3)
PROT SERPL-MCNC: 5.5 G/DL — LOW (ref 6–8.3)
RBC # BLD: 2.68 M/UL — LOW (ref 3.8–5.2)
RBC # FLD: 15.2 % — HIGH (ref 10.3–14.5)
SODIUM SERPL-SCNC: 132 MMOL/L — LOW (ref 135–145)
SODIUM SERPL-SCNC: 134 MMOL/L — LOW (ref 135–145)
SRA INTERP SER-IMP: SIGNIFICANT CHANGE UP
VANCOMYCIN FLD-MCNC: 21 UG/ML — SIGNIFICANT CHANGE UP
WBC # BLD: 15.53 K/UL — HIGH (ref 3.8–10.5)
WBC # FLD AUTO: 15.53 K/UL — HIGH (ref 3.8–10.5)

## 2017-02-01 PROCEDURE — 99232 SBSQ HOSP IP/OBS MODERATE 35: CPT | Mod: GC

## 2017-02-01 PROCEDURE — 99291 CRITICAL CARE FIRST HOUR: CPT

## 2017-02-01 RX ORDER — INSULIN LISPRO 100/ML
VIAL (ML) SUBCUTANEOUS EVERY 6 HOURS
Qty: 0 | Refills: 0 | Status: DISCONTINUED | OUTPATIENT
Start: 2017-02-01 | End: 2017-02-10

## 2017-02-01 RX ORDER — HUMAN INSULIN 100 [IU]/ML
8 INJECTION, SUSPENSION SUBCUTANEOUS EVERY 6 HOURS
Qty: 0 | Refills: 0 | Status: DISCONTINUED | OUTPATIENT
Start: 2017-02-01 | End: 2017-02-01

## 2017-02-01 RX ORDER — INSULIN LISPRO 100/ML
VIAL (ML) SUBCUTANEOUS AT BEDTIME
Qty: 0 | Refills: 0 | Status: DISCONTINUED | OUTPATIENT
Start: 2017-02-01 | End: 2017-02-01

## 2017-02-01 RX ORDER — MIDODRINE HYDROCHLORIDE 2.5 MG/1
10 TABLET ORAL EVERY 8 HOURS
Qty: 0 | Refills: 0 | Status: DISCONTINUED | OUTPATIENT
Start: 2017-02-01 | End: 2017-02-04

## 2017-02-01 RX ORDER — INSULIN LISPRO 100/ML
VIAL (ML) SUBCUTANEOUS
Qty: 0 | Refills: 0 | Status: DISCONTINUED | OUTPATIENT
Start: 2017-02-01 | End: 2017-02-01

## 2017-02-01 RX ORDER — HUMAN INSULIN 100 [IU]/ML
8 INJECTION, SUSPENSION SUBCUTANEOUS EVERY 6 HOURS
Qty: 0 | Refills: 0 | Status: DISCONTINUED | OUTPATIENT
Start: 2017-02-01 | End: 2017-02-02

## 2017-02-01 RX ADMIN — MIDODRINE HYDROCHLORIDE 10 MILLIGRAM(S): 2.5 TABLET ORAL at 13:57

## 2017-02-01 RX ADMIN — ALBUTEROL 1 PUFF(S): 90 AEROSOL, METERED ORAL at 14:56

## 2017-02-01 RX ADMIN — Medication 1 PUFF(S): at 10:21

## 2017-02-01 RX ADMIN — Medication 1 PUFF(S): at 04:11

## 2017-02-01 RX ADMIN — PIPERACILLIN AND TAZOBACTAM 25 GRAM(S): 4; .5 INJECTION, POWDER, LYOPHILIZED, FOR SOLUTION INTRAVENOUS at 05:16

## 2017-02-01 RX ADMIN — Medication 4: at 11:23

## 2017-02-01 RX ADMIN — ALBUTEROL 1 PUFF(S): 90 AEROSOL, METERED ORAL at 22:46

## 2017-02-01 RX ADMIN — Medication 1 PUFF(S): at 22:46

## 2017-02-01 RX ADMIN — MIDODRINE HYDROCHLORIDE 10 MILLIGRAM(S): 2.5 TABLET ORAL at 05:16

## 2017-02-01 RX ADMIN — Medication 2: at 17:05

## 2017-02-01 RX ADMIN — ALBUTEROL 1 PUFF(S): 90 AEROSOL, METERED ORAL at 04:11

## 2017-02-01 RX ADMIN — Medication 1 PUFF(S): at 14:57

## 2017-02-01 RX ADMIN — PANTOPRAZOLE SODIUM 40 MILLIGRAM(S): 20 TABLET, DELAYED RELEASE ORAL at 11:23

## 2017-02-01 RX ADMIN — ALBUTEROL 1 PUFF(S): 90 AEROSOL, METERED ORAL at 10:21

## 2017-02-01 RX ADMIN — HUMAN INSULIN 8 UNIT(S): 100 INJECTION, SUSPENSION SUBCUTANEOUS at 17:05

## 2017-02-01 RX ADMIN — Medication 4: at 05:59

## 2017-02-01 RX ADMIN — Medication 3: at 00:05

## 2017-02-02 LAB
ALBUMIN SERPL ELPH-MCNC: 2 G/DL — LOW (ref 3.3–5)
ALP SERPL-CCNC: 147 U/L — HIGH (ref 40–120)
ALT FLD-CCNC: 14 U/L — SIGNIFICANT CHANGE UP (ref 4–33)
AST SERPL-CCNC: 23 U/L — SIGNIFICANT CHANGE UP (ref 4–32)
BACTERIA SPT RESP CULT: SIGNIFICANT CHANGE UP
BACTERIA SPT RESP CULT: SIGNIFICANT CHANGE UP
BILIRUB SERPL-MCNC: 0.3 MG/DL — SIGNIFICANT CHANGE UP (ref 0.2–1.2)
BUN SERPL-MCNC: 45 MG/DL — HIGH (ref 7–23)
BUN SERPL-MCNC: 54 MG/DL — HIGH (ref 7–23)
CALCIUM SERPL-MCNC: 8.2 MG/DL — LOW (ref 8.4–10.5)
CALCIUM SERPL-MCNC: 8.2 MG/DL — LOW (ref 8.4–10.5)
CHLORIDE SERPL-SCNC: 103 MMOL/L — SIGNIFICANT CHANGE UP (ref 98–107)
CHLORIDE SERPL-SCNC: 99 MMOL/L — SIGNIFICANT CHANGE UP (ref 98–107)
CO2 SERPL-SCNC: 21 MMOL/L — LOW (ref 22–31)
CO2 SERPL-SCNC: 23 MMOL/L — SIGNIFICANT CHANGE UP (ref 22–31)
CREAT SERPL-MCNC: 2.67 MG/DL — HIGH (ref 0.5–1.3)
CREAT SERPL-MCNC: 3.25 MG/DL — HIGH (ref 0.5–1.3)
GLUCOSE SERPL-MCNC: 227 MG/DL — HIGH (ref 70–99)
GLUCOSE SERPL-MCNC: 243 MG/DL — HIGH (ref 70–99)
HCT VFR BLD CALC: 23.4 % — LOW (ref 34.5–45)
HGB BLD-MCNC: 7.4 G/DL — LOW (ref 11.5–15.5)
MAGNESIUM SERPL-MCNC: 1.9 MG/DL — SIGNIFICANT CHANGE UP (ref 1.6–2.6)
MCHC RBC-ENTMCNC: 27.2 PG — SIGNIFICANT CHANGE UP (ref 27–34)
MCHC RBC-ENTMCNC: 31.6 % — LOW (ref 32–36)
MCV RBC AUTO: 86 FL — SIGNIFICANT CHANGE UP (ref 80–100)
PHOSPHATE SERPL-MCNC: 2.8 MG/DL — SIGNIFICANT CHANGE UP (ref 2.5–4.5)
PLATELET # BLD AUTO: 51 K/UL — LOW (ref 150–400)
PMV BLD: 11.5 FL — SIGNIFICANT CHANGE UP (ref 7–13)
POTASSIUM SERPL-MCNC: 3.4 MMOL/L — LOW (ref 3.5–5.3)
POTASSIUM SERPL-MCNC: 3.7 MMOL/L — SIGNIFICANT CHANGE UP (ref 3.5–5.3)
POTASSIUM SERPL-SCNC: 3.4 MMOL/L — LOW (ref 3.5–5.3)
POTASSIUM SERPL-SCNC: 3.7 MMOL/L — SIGNIFICANT CHANGE UP (ref 3.5–5.3)
PROT SERPL-MCNC: 5.3 G/DL — LOW (ref 6–8.3)
RBC # BLD: 2.72 M/UL — LOW (ref 3.8–5.2)
RBC # FLD: 14.6 % — HIGH (ref 10.3–14.5)
SODIUM SERPL-SCNC: 137 MMOL/L — SIGNIFICANT CHANGE UP (ref 135–145)
SODIUM SERPL-SCNC: 142 MMOL/L — SIGNIFICANT CHANGE UP (ref 135–145)
WBC # BLD: 13.92 K/UL — HIGH (ref 3.8–10.5)
WBC # FLD AUTO: 13.92 K/UL — HIGH (ref 3.8–10.5)

## 2017-02-02 PROCEDURE — 99291 CRITICAL CARE FIRST HOUR: CPT

## 2017-02-02 PROCEDURE — 99232 SBSQ HOSP IP/OBS MODERATE 35: CPT | Mod: GC

## 2017-02-02 RX ORDER — POTASSIUM CHLORIDE 20 MEQ
20 PACKET (EA) ORAL ONCE
Qty: 0 | Refills: 0 | Status: DISCONTINUED | OUTPATIENT
Start: 2017-02-02 | End: 2017-02-02

## 2017-02-02 RX ORDER — HUMAN INSULIN 100 [IU]/ML
10 INJECTION, SUSPENSION SUBCUTANEOUS EVERY 6 HOURS
Qty: 0 | Refills: 0 | Status: DISCONTINUED | OUTPATIENT
Start: 2017-02-02 | End: 2017-02-06

## 2017-02-02 RX ORDER — POTASSIUM CHLORIDE 20 MEQ
40 PACKET (EA) ORAL ONCE
Qty: 0 | Refills: 0 | Status: DISCONTINUED | OUTPATIENT
Start: 2017-02-02 | End: 2017-02-02

## 2017-02-02 RX ORDER — HUMAN INSULIN 100 [IU]/ML
10 INJECTION, SUSPENSION SUBCUTANEOUS
Qty: 0 | Refills: 0 | Status: DISCONTINUED | OUTPATIENT
Start: 2017-02-02 | End: 2017-02-02

## 2017-02-02 RX ORDER — HUMAN INSULIN 100 [IU]/ML
10 INJECTION, SUSPENSION SUBCUTANEOUS ONCE
Qty: 0 | Refills: 0 | Status: DISCONTINUED | OUTPATIENT
Start: 2017-02-02 | End: 2017-02-02

## 2017-02-02 RX ORDER — HEPARIN SODIUM 5000 [USP'U]/ML
5000 INJECTION INTRAVENOUS; SUBCUTANEOUS EVERY 8 HOURS
Qty: 0 | Refills: 0 | Status: DISCONTINUED | OUTPATIENT
Start: 2017-02-02 | End: 2017-02-17

## 2017-02-02 RX ORDER — HUMAN INSULIN 100 [IU]/ML
10 INJECTION, SUSPENSION SUBCUTANEOUS AT BEDTIME
Qty: 0 | Refills: 0 | Status: DISCONTINUED | OUTPATIENT
Start: 2017-02-02 | End: 2017-02-02

## 2017-02-02 RX ORDER — POTASSIUM CHLORIDE 20 MEQ
10 PACKET (EA) ORAL ONCE
Qty: 0 | Refills: 0 | Status: COMPLETED | OUTPATIENT
Start: 2017-02-02 | End: 2017-02-02

## 2017-02-02 RX ADMIN — HUMAN INSULIN 10 UNIT(S): 100 INJECTION, SUSPENSION SUBCUTANEOUS at 00:40

## 2017-02-02 RX ADMIN — Medication 4: at 12:09

## 2017-02-02 RX ADMIN — ALBUTEROL 1 PUFF(S): 90 AEROSOL, METERED ORAL at 15:35

## 2017-02-02 RX ADMIN — Medication 4: at 17:18

## 2017-02-02 RX ADMIN — Medication 10: at 00:11

## 2017-02-02 RX ADMIN — HUMAN INSULIN 10 UNIT(S): 100 INJECTION, SUSPENSION SUBCUTANEOUS at 17:18

## 2017-02-02 RX ADMIN — PANTOPRAZOLE SODIUM 40 MILLIGRAM(S): 20 TABLET, DELAYED RELEASE ORAL at 14:21

## 2017-02-02 RX ADMIN — HUMAN INSULIN 10 UNIT(S): 100 INJECTION, SUSPENSION SUBCUTANEOUS at 12:09

## 2017-02-02 RX ADMIN — Medication 650 MILLIGRAM(S): at 00:40

## 2017-02-02 RX ADMIN — MIDODRINE HYDROCHLORIDE 10 MILLIGRAM(S): 2.5 TABLET ORAL at 21:40

## 2017-02-02 RX ADMIN — Medication 1 PUFF(S): at 15:38

## 2017-02-02 RX ADMIN — ALBUTEROL 1 PUFF(S): 90 AEROSOL, METERED ORAL at 21:36

## 2017-02-02 RX ADMIN — ALBUTEROL 1 PUFF(S): 90 AEROSOL, METERED ORAL at 11:40

## 2017-02-02 RX ADMIN — ALBUTEROL 1 PUFF(S): 90 AEROSOL, METERED ORAL at 03:51

## 2017-02-02 RX ADMIN — Medication 1 PUFF(S): at 11:40

## 2017-02-02 RX ADMIN — Medication 10 MILLIEQUIVALENT(S): at 06:53

## 2017-02-02 RX ADMIN — Medication 2: at 06:16

## 2017-02-02 RX ADMIN — Medication 1 PUFF(S): at 03:51

## 2017-02-02 RX ADMIN — Medication 1 PUFF(S): at 21:36

## 2017-02-02 RX ADMIN — HUMAN INSULIN 10 UNIT(S): 100 INJECTION, SUSPENSION SUBCUTANEOUS at 06:17

## 2017-02-02 RX ADMIN — HEPARIN SODIUM 5000 UNIT(S): 5000 INJECTION INTRAVENOUS; SUBCUTANEOUS at 14:20

## 2017-02-02 RX ADMIN — HEPARIN SODIUM 5000 UNIT(S): 5000 INJECTION INTRAVENOUS; SUBCUTANEOUS at 21:39

## 2017-02-03 LAB
ALBUMIN SERPL ELPH-MCNC: 1.9 G/DL — LOW (ref 3.3–5)
ALP SERPL-CCNC: 132 U/L — HIGH (ref 40–120)
ALT FLD-CCNC: 16 U/L — SIGNIFICANT CHANGE UP (ref 4–33)
AST SERPL-CCNC: 23 U/L — SIGNIFICANT CHANGE UP (ref 4–32)
BASOPHILS # BLD AUTO: 0.03 K/UL — SIGNIFICANT CHANGE UP (ref 0–0.2)
BASOPHILS NFR BLD AUTO: 0.2 % — SIGNIFICANT CHANGE UP (ref 0–2)
BILIRUB SERPL-MCNC: 0.3 MG/DL — SIGNIFICANT CHANGE UP (ref 0.2–1.2)
BUN SERPL-MCNC: 64 MG/DL — HIGH (ref 7–23)
CALCIUM SERPL-MCNC: 8.8 MG/DL — SIGNIFICANT CHANGE UP (ref 8.4–10.5)
CHLORIDE SERPL-SCNC: 99 MMOL/L — SIGNIFICANT CHANGE UP (ref 98–107)
CO2 SERPL-SCNC: 24 MMOL/L — SIGNIFICANT CHANGE UP (ref 22–31)
CREAT SERPL-MCNC: 3.6 MG/DL — HIGH (ref 0.5–1.3)
EOSINOPHIL # BLD AUTO: 0.01 K/UL — SIGNIFICANT CHANGE UP (ref 0–0.5)
EOSINOPHIL NFR BLD AUTO: 0.1 % — SIGNIFICANT CHANGE UP (ref 0–6)
GLUCOSE SERPL-MCNC: 178 MG/DL — HIGH (ref 70–99)
HCT VFR BLD CALC: 23 % — LOW (ref 34.5–45)
HGB BLD-MCNC: 7.3 G/DL — LOW (ref 11.5–15.5)
IMM GRANULOCYTES NFR BLD AUTO: 6.6 % — HIGH (ref 0–1.5)
LYMPHOCYTES # BLD AUTO: 0.95 K/UL — LOW (ref 1–3.3)
LYMPHOCYTES # BLD AUTO: 6.3 % — LOW (ref 13–44)
MAGNESIUM SERPL-MCNC: 2.1 MG/DL — SIGNIFICANT CHANGE UP (ref 1.6–2.6)
MCHC RBC-ENTMCNC: 27.7 PG — SIGNIFICANT CHANGE UP (ref 27–34)
MCHC RBC-ENTMCNC: 31.7 % — LOW (ref 32–36)
MCV RBC AUTO: 87.1 FL — SIGNIFICANT CHANGE UP (ref 80–100)
MONOCYTES # BLD AUTO: 0.69 K/UL — SIGNIFICANT CHANGE UP (ref 0–0.9)
MONOCYTES NFR BLD AUTO: 4.5 % — SIGNIFICANT CHANGE UP (ref 2–14)
NEUTROPHILS # BLD AUTO: 12.52 K/UL — HIGH (ref 1.8–7.4)
NEUTROPHILS NFR BLD AUTO: 82.3 % — HIGH (ref 43–77)
PHOSPHATE SERPL-MCNC: 3.9 MG/DL — SIGNIFICANT CHANGE UP (ref 2.5–4.5)
PLATELET # BLD AUTO: 88 K/UL — LOW (ref 150–400)
PMV BLD: 11.9 FL — SIGNIFICANT CHANGE UP (ref 7–13)
POTASSIUM SERPL-MCNC: 3.6 MMOL/L — SIGNIFICANT CHANGE UP (ref 3.5–5.3)
POTASSIUM SERPL-SCNC: 3.6 MMOL/L — SIGNIFICANT CHANGE UP (ref 3.5–5.3)
PROT SERPL-MCNC: 5.4 G/DL — LOW (ref 6–8.3)
RBC # BLD: 2.64 M/UL — LOW (ref 3.8–5.2)
RBC # FLD: 14.8 % — HIGH (ref 10.3–14.5)
SODIUM SERPL-SCNC: 139 MMOL/L — SIGNIFICANT CHANGE UP (ref 135–145)
WBC # BLD: 15.2 K/UL — HIGH (ref 3.8–10.5)
WBC # FLD AUTO: 15.2 K/UL — HIGH (ref 3.8–10.5)

## 2017-02-03 PROCEDURE — 99232 SBSQ HOSP IP/OBS MODERATE 35: CPT | Mod: GC

## 2017-02-03 PROCEDURE — 99291 CRITICAL CARE FIRST HOUR: CPT

## 2017-02-03 RX ADMIN — HEPARIN SODIUM 5000 UNIT(S): 5000 INJECTION INTRAVENOUS; SUBCUTANEOUS at 23:02

## 2017-02-03 RX ADMIN — PANTOPRAZOLE SODIUM 40 MILLIGRAM(S): 20 TABLET, DELAYED RELEASE ORAL at 13:12

## 2017-02-03 RX ADMIN — HUMAN INSULIN 10 UNIT(S): 100 INJECTION, SUSPENSION SUBCUTANEOUS at 13:14

## 2017-02-03 RX ADMIN — ALBUTEROL 1 PUFF(S): 90 AEROSOL, METERED ORAL at 15:57

## 2017-02-03 RX ADMIN — Medication 1 PUFF(S): at 15:57

## 2017-02-03 RX ADMIN — HEPARIN SODIUM 5000 UNIT(S): 5000 INJECTION INTRAVENOUS; SUBCUTANEOUS at 05:13

## 2017-02-03 RX ADMIN — Medication 1 PUFF(S): at 21:17

## 2017-02-03 RX ADMIN — Medication 4: at 00:13

## 2017-02-03 RX ADMIN — HUMAN INSULIN 10 UNIT(S): 100 INJECTION, SUSPENSION SUBCUTANEOUS at 18:58

## 2017-02-03 RX ADMIN — Medication 2: at 18:57

## 2017-02-03 RX ADMIN — HEPARIN SODIUM 5000 UNIT(S): 5000 INJECTION INTRAVENOUS; SUBCUTANEOUS at 14:58

## 2017-02-03 RX ADMIN — HUMAN INSULIN 10 UNIT(S): 100 INJECTION, SUSPENSION SUBCUTANEOUS at 00:13

## 2017-02-03 RX ADMIN — ALBUTEROL 1 PUFF(S): 90 AEROSOL, METERED ORAL at 21:18

## 2017-02-03 RX ADMIN — Medication 1 PUFF(S): at 09:31

## 2017-02-03 RX ADMIN — Medication 1 PUFF(S): at 03:44

## 2017-02-03 RX ADMIN — HUMAN INSULIN 10 UNIT(S): 100 INJECTION, SUSPENSION SUBCUTANEOUS at 05:13

## 2017-02-03 RX ADMIN — Medication 4: at 23:02

## 2017-02-03 RX ADMIN — HUMAN INSULIN 10 UNIT(S): 100 INJECTION, SUSPENSION SUBCUTANEOUS at 23:02

## 2017-02-03 RX ADMIN — ALBUTEROL 1 PUFF(S): 90 AEROSOL, METERED ORAL at 03:44

## 2017-02-03 RX ADMIN — ALBUTEROL 1 PUFF(S): 90 AEROSOL, METERED ORAL at 09:31

## 2017-02-04 LAB
BUN SERPL-MCNC: 41 MG/DL — HIGH (ref 7–23)
CALCIUM SERPL-MCNC: 9 MG/DL — SIGNIFICANT CHANGE UP (ref 8.4–10.5)
CHLORIDE SERPL-SCNC: 99 MMOL/L — SIGNIFICANT CHANGE UP (ref 98–107)
CO2 SERPL-SCNC: 25 MMOL/L — SIGNIFICANT CHANGE UP (ref 22–31)
CREAT SERPL-MCNC: 2.49 MG/DL — HIGH (ref 0.5–1.3)
GLUCOSE SERPL-MCNC: 127 MG/DL — HIGH (ref 70–99)
HCT VFR BLD CALC: 23.1 % — LOW (ref 34.5–45)
HGB BLD-MCNC: 7.3 G/DL — LOW (ref 11.5–15.5)
MAGNESIUM SERPL-MCNC: 2 MG/DL — SIGNIFICANT CHANGE UP (ref 1.6–2.6)
MCHC RBC-ENTMCNC: 27.9 PG — SIGNIFICANT CHANGE UP (ref 27–34)
MCHC RBC-ENTMCNC: 31.6 % — LOW (ref 32–36)
MCV RBC AUTO: 88.2 FL — SIGNIFICANT CHANGE UP (ref 80–100)
PHOSPHATE SERPL-MCNC: 2.6 MG/DL — SIGNIFICANT CHANGE UP (ref 2.5–4.5)
PLATELET # BLD AUTO: 136 K/UL — LOW (ref 150–400)
PMV BLD: 11.7 FL — SIGNIFICANT CHANGE UP (ref 7–13)
POTASSIUM SERPL-MCNC: 3.2 MMOL/L — LOW (ref 3.5–5.3)
POTASSIUM SERPL-SCNC: 3.2 MMOL/L — LOW (ref 3.5–5.3)
RBC # BLD: 2.62 M/UL — LOW (ref 3.8–5.2)
RBC # FLD: 14.9 % — HIGH (ref 10.3–14.5)
SODIUM SERPL-SCNC: 142 MMOL/L — SIGNIFICANT CHANGE UP (ref 135–145)
WBC # BLD: 17.45 K/UL — HIGH (ref 3.8–10.5)
WBC # FLD AUTO: 17.45 K/UL — HIGH (ref 3.8–10.5)

## 2017-02-04 PROCEDURE — 99291 CRITICAL CARE FIRST HOUR: CPT

## 2017-02-04 PROCEDURE — 99232 SBSQ HOSP IP/OBS MODERATE 35: CPT | Mod: GC

## 2017-02-04 PROCEDURE — 71010: CPT | Mod: 26,76

## 2017-02-04 RX ORDER — POTASSIUM CHLORIDE 20 MEQ
20 PACKET (EA) ORAL ONCE
Qty: 0 | Refills: 0 | Status: COMPLETED | OUTPATIENT
Start: 2017-02-04 | End: 2017-02-04

## 2017-02-04 RX ORDER — POTASSIUM CHLORIDE 20 MEQ
10 PACKET (EA) ORAL ONCE
Qty: 0 | Refills: 0 | Status: DISCONTINUED | OUTPATIENT
Start: 2017-02-04 | End: 2017-02-04

## 2017-02-04 RX ORDER — DEXMEDETOMIDINE HYDROCHLORIDE IN 0.9% SODIUM CHLORIDE 4 UG/ML
0.2 INJECTION INTRAVENOUS
Qty: 200 | Refills: 0 | Status: DISCONTINUED | OUTPATIENT
Start: 2017-02-04 | End: 2017-02-06

## 2017-02-04 RX ORDER — POTASSIUM CHLORIDE 20 MEQ
20 PACKET (EA) ORAL ONCE
Qty: 0 | Refills: 0 | Status: DISCONTINUED | OUTPATIENT
Start: 2017-02-04 | End: 2017-02-04

## 2017-02-04 RX ADMIN — Medication 2: at 12:44

## 2017-02-04 RX ADMIN — HUMAN INSULIN 10 UNIT(S): 100 INJECTION, SUSPENSION SUBCUTANEOUS at 12:36

## 2017-02-04 RX ADMIN — HEPARIN SODIUM 5000 UNIT(S): 5000 INJECTION INTRAVENOUS; SUBCUTANEOUS at 05:12

## 2017-02-04 RX ADMIN — DEXMEDETOMIDINE HYDROCHLORIDE IN 0.9% SODIUM CHLORIDE 4.04 MICROGRAM(S)/KG/HR: 4 INJECTION INTRAVENOUS at 19:00

## 2017-02-04 RX ADMIN — ALBUTEROL 1 PUFF(S): 90 AEROSOL, METERED ORAL at 03:27

## 2017-02-04 RX ADMIN — Medication 1 PUFF(S): at 09:28

## 2017-02-04 RX ADMIN — ALBUTEROL 1 PUFF(S): 90 AEROSOL, METERED ORAL at 15:34

## 2017-02-04 RX ADMIN — PANTOPRAZOLE SODIUM 40 MILLIGRAM(S): 20 TABLET, DELAYED RELEASE ORAL at 12:36

## 2017-02-04 RX ADMIN — Medication 1 PUFF(S): at 15:34

## 2017-02-04 RX ADMIN — Medication 1 PUFF(S): at 03:27

## 2017-02-04 RX ADMIN — ALBUTEROL 1 PUFF(S): 90 AEROSOL, METERED ORAL at 21:47

## 2017-02-04 RX ADMIN — Medication 2: at 18:12

## 2017-02-04 RX ADMIN — Medication 20 MILLIEQUIVALENT(S): at 08:44

## 2017-02-04 RX ADMIN — DEXMEDETOMIDINE HYDROCHLORIDE IN 0.9% SODIUM CHLORIDE 4.04 MICROGRAM(S)/KG/HR: 4 INJECTION INTRAVENOUS at 05:04

## 2017-02-04 RX ADMIN — HEPARIN SODIUM 5000 UNIT(S): 5000 INJECTION INTRAVENOUS; SUBCUTANEOUS at 14:09

## 2017-02-04 RX ADMIN — HUMAN INSULIN 10 UNIT(S): 100 INJECTION, SUSPENSION SUBCUTANEOUS at 18:12

## 2017-02-04 RX ADMIN — DEXMEDETOMIDINE HYDROCHLORIDE IN 0.9% SODIUM CHLORIDE 4.04 MICROGRAM(S)/KG/HR: 4 INJECTION INTRAVENOUS at 08:44

## 2017-02-04 RX ADMIN — Medication 1 PUFF(S): at 21:46

## 2017-02-04 RX ADMIN — ALBUTEROL 1 PUFF(S): 90 AEROSOL, METERED ORAL at 09:28

## 2017-02-04 RX ADMIN — HEPARIN SODIUM 5000 UNIT(S): 5000 INJECTION INTRAVENOUS; SUBCUTANEOUS at 22:16

## 2017-02-05 LAB
BASOPHILS # BLD AUTO: 0.01 K/UL — SIGNIFICANT CHANGE UP (ref 0–0.2)
BASOPHILS NFR BLD AUTO: 0.1 % — SIGNIFICANT CHANGE UP (ref 0–2)
BUN SERPL-MCNC: 61 MG/DL — HIGH (ref 7–23)
CALCIUM SERPL-MCNC: 8.8 MG/DL — SIGNIFICANT CHANGE UP (ref 8.4–10.5)
CHLORIDE SERPL-SCNC: 101 MMOL/L — SIGNIFICANT CHANGE UP (ref 98–107)
CO2 SERPL-SCNC: 23 MMOL/L — SIGNIFICANT CHANGE UP (ref 22–31)
CREAT SERPL-MCNC: 3.19 MG/DL — HIGH (ref 0.5–1.3)
EOSINOPHIL # BLD AUTO: 0 K/UL — SIGNIFICANT CHANGE UP (ref 0–0.5)
EOSINOPHIL NFR BLD AUTO: 0 % — SIGNIFICANT CHANGE UP (ref 0–6)
GLUCOSE SERPL-MCNC: 148 MG/DL — HIGH (ref 70–99)
HCT VFR BLD CALC: 23.5 % — LOW (ref 34.5–45)
HGB BLD-MCNC: 7.4 G/DL — LOW (ref 11.5–15.5)
IMM GRANULOCYTES NFR BLD AUTO: 1.6 % — HIGH (ref 0–1.5)
LYMPHOCYTES # BLD AUTO: 0.73 K/UL — LOW (ref 1–3.3)
LYMPHOCYTES # BLD AUTO: 5 % — LOW (ref 13–44)
MAGNESIUM SERPL-MCNC: 2.2 MG/DL — SIGNIFICANT CHANGE UP (ref 1.6–2.6)
MCHC RBC-ENTMCNC: 27.7 PG — SIGNIFICANT CHANGE UP (ref 27–34)
MCHC RBC-ENTMCNC: 31.5 % — LOW (ref 32–36)
MCV RBC AUTO: 88 FL — SIGNIFICANT CHANGE UP (ref 80–100)
MONOCYTES # BLD AUTO: 0.71 K/UL — SIGNIFICANT CHANGE UP (ref 0–0.9)
MONOCYTES NFR BLD AUTO: 4.8 % — SIGNIFICANT CHANGE UP (ref 2–14)
NEUTROPHILS # BLD AUTO: 13 K/UL — HIGH (ref 1.8–7.4)
NEUTROPHILS NFR BLD AUTO: 88.5 % — HIGH (ref 43–77)
PHOSPHATE SERPL-MCNC: 4.3 MG/DL — SIGNIFICANT CHANGE UP (ref 2.5–4.5)
PLATELET # BLD AUTO: 174 K/UL — SIGNIFICANT CHANGE UP (ref 150–400)
PMV BLD: 10.9 FL — SIGNIFICANT CHANGE UP (ref 7–13)
POTASSIUM SERPL-MCNC: 4.3 MMOL/L — SIGNIFICANT CHANGE UP (ref 3.5–5.3)
POTASSIUM SERPL-SCNC: 4.3 MMOL/L — SIGNIFICANT CHANGE UP (ref 3.5–5.3)
RBC # BLD: 2.67 M/UL — LOW (ref 3.8–5.2)
RBC # FLD: 14.9 % — HIGH (ref 10.3–14.5)
SODIUM SERPL-SCNC: 140 MMOL/L — SIGNIFICANT CHANGE UP (ref 135–145)
WBC # BLD: 14.68 K/UL — HIGH (ref 3.8–10.5)
WBC # FLD AUTO: 14.68 K/UL — HIGH (ref 3.8–10.5)

## 2017-02-05 PROCEDURE — 99291 CRITICAL CARE FIRST HOUR: CPT

## 2017-02-05 PROCEDURE — 99231 SBSQ HOSP IP/OBS SF/LOW 25: CPT | Mod: GC

## 2017-02-05 RX ADMIN — ALBUTEROL 1 PUFF(S): 90 AEROSOL, METERED ORAL at 09:52

## 2017-02-05 RX ADMIN — HUMAN INSULIN 10 UNIT(S): 100 INJECTION, SUSPENSION SUBCUTANEOUS at 18:08

## 2017-02-05 RX ADMIN — ALBUTEROL 1 PUFF(S): 90 AEROSOL, METERED ORAL at 15:20

## 2017-02-05 RX ADMIN — HUMAN INSULIN 10 UNIT(S): 100 INJECTION, SUSPENSION SUBCUTANEOUS at 12:28

## 2017-02-05 RX ADMIN — Medication 1 PUFF(S): at 15:20

## 2017-02-05 RX ADMIN — Medication 1 PUFF(S): at 03:53

## 2017-02-05 RX ADMIN — ALBUTEROL 1 PUFF(S): 90 AEROSOL, METERED ORAL at 21:31

## 2017-02-05 RX ADMIN — DEXMEDETOMIDINE HYDROCHLORIDE IN 0.9% SODIUM CHLORIDE 4.04 MICROGRAM(S)/KG/HR: 4 INJECTION INTRAVENOUS at 20:00

## 2017-02-05 RX ADMIN — HEPARIN SODIUM 5000 UNIT(S): 5000 INJECTION INTRAVENOUS; SUBCUTANEOUS at 14:38

## 2017-02-05 RX ADMIN — Medication 1 PUFF(S): at 09:52

## 2017-02-05 RX ADMIN — HEPARIN SODIUM 5000 UNIT(S): 5000 INJECTION INTRAVENOUS; SUBCUTANEOUS at 21:36

## 2017-02-05 RX ADMIN — PANTOPRAZOLE SODIUM 40 MILLIGRAM(S): 20 TABLET, DELAYED RELEASE ORAL at 14:39

## 2017-02-05 RX ADMIN — HUMAN INSULIN 10 UNIT(S): 100 INJECTION, SUSPENSION SUBCUTANEOUS at 00:43

## 2017-02-05 RX ADMIN — HUMAN INSULIN 10 UNIT(S): 100 INJECTION, SUSPENSION SUBCUTANEOUS at 05:19

## 2017-02-05 RX ADMIN — Medication 1 PUFF(S): at 21:32

## 2017-02-05 RX ADMIN — HUMAN INSULIN 10 UNIT(S): 100 INJECTION, SUSPENSION SUBCUTANEOUS at 23:58

## 2017-02-05 RX ADMIN — Medication 4: at 23:58

## 2017-02-05 RX ADMIN — HEPARIN SODIUM 5000 UNIT(S): 5000 INJECTION INTRAVENOUS; SUBCUTANEOUS at 05:18

## 2017-02-05 RX ADMIN — Medication 6: at 12:29

## 2017-02-05 RX ADMIN — ALBUTEROL 1 PUFF(S): 90 AEROSOL, METERED ORAL at 03:54

## 2017-02-05 NOTE — DIETITIAN INITIAL EVALUATION ADULT. - PROBLEM SELECTOR PLAN 6
-CK, CKMB elevated, but troponin T normal, EKG normal  -will trend CE    Update:     CK, CKMB elevated. No EKG changes on first EKG. 2nd EKG showed no changes, NSR 84. Bedside US done by MICU team did not show any cardiac strain. No current CP. Per MICU team, no need for telemetry at this time.

## 2017-02-05 NOTE — DIETITIAN INITIAL EVALUATION ADULT. - OTHER INFO
Pt seen for critical care LOS.  At this time, pt remains on mechanical ventillation. Pt also receiving intermittent HD treatments.  Oliguric.  Noted from hx, pt was receiving Vit D2 supplementation - 50,000 IU PTA.  Not weight change noted in hx.

## 2017-02-06 LAB
BUN SERPL-MCNC: 50 MG/DL — HIGH (ref 7–23)
CALCIUM SERPL-MCNC: 8.6 MG/DL — SIGNIFICANT CHANGE UP (ref 8.4–10.5)
CHLORIDE SERPL-SCNC: 101 MMOL/L — SIGNIFICANT CHANGE UP (ref 98–107)
CO2 SERPL-SCNC: 26 MMOL/L — SIGNIFICANT CHANGE UP (ref 22–31)
CREAT SERPL-MCNC: 2.58 MG/DL — HIGH (ref 0.5–1.3)
GLUCOSE SERPL-MCNC: 209 MG/DL — HIGH (ref 70–99)
HCT VFR BLD CALC: 37.7 % — SIGNIFICANT CHANGE UP (ref 34.5–45)
HGB BLD-MCNC: 11.7 G/DL — SIGNIFICANT CHANGE UP (ref 11.5–15.5)
MAGNESIUM SERPL-MCNC: 2.2 MG/DL — SIGNIFICANT CHANGE UP (ref 1.6–2.6)
MCHC RBC-ENTMCNC: 27.4 PG — SIGNIFICANT CHANGE UP (ref 27–34)
MCHC RBC-ENTMCNC: 31 % — LOW (ref 32–36)
MCV RBC AUTO: 88.3 FL — SIGNIFICANT CHANGE UP (ref 80–100)
PHOSPHATE SERPL-MCNC: 3.8 MG/DL — SIGNIFICANT CHANGE UP (ref 2.5–4.5)
PLATELET # BLD AUTO: 169 K/UL — SIGNIFICANT CHANGE UP (ref 150–400)
PMV BLD: 11.4 FL — SIGNIFICANT CHANGE UP (ref 7–13)
POTASSIUM SERPL-MCNC: 3.9 MMOL/L — SIGNIFICANT CHANGE UP (ref 3.5–5.3)
POTASSIUM SERPL-SCNC: 3.9 MMOL/L — SIGNIFICANT CHANGE UP (ref 3.5–5.3)
PREALB SERPL-MCNC: 13 MG/DL — LOW (ref 20–40)
RBC # BLD: 4.27 M/UL — SIGNIFICANT CHANGE UP (ref 3.8–5.2)
RBC # FLD: 14.9 % — HIGH (ref 10.3–14.5)
SODIUM SERPL-SCNC: 141 MMOL/L — SIGNIFICANT CHANGE UP (ref 135–145)
WBC # BLD: 9.78 K/UL — SIGNIFICANT CHANGE UP (ref 3.8–10.5)
WBC # FLD AUTO: 9.78 K/UL — SIGNIFICANT CHANGE UP (ref 3.8–10.5)

## 2017-02-06 PROCEDURE — 99291 CRITICAL CARE FIRST HOUR: CPT | Mod: 25

## 2017-02-06 PROCEDURE — 76604 US EXAM CHEST: CPT | Mod: 26

## 2017-02-06 PROCEDURE — 99232 SBSQ HOSP IP/OBS MODERATE 35: CPT | Mod: GC

## 2017-02-06 PROCEDURE — 93308 TTE F-UP OR LMTD: CPT | Mod: 26

## 2017-02-06 RX ORDER — DEXTROSE 50 % IN WATER 50 %
25 SYRINGE (ML) INTRAVENOUS ONCE
Qty: 0 | Refills: 0 | Status: COMPLETED | OUTPATIENT
Start: 2017-02-06 | End: 2017-02-06

## 2017-02-06 RX ORDER — DEXMEDETOMIDINE HYDROCHLORIDE IN 0.9% SODIUM CHLORIDE 4 UG/ML
0.2 INJECTION INTRAVENOUS
Qty: 200 | Refills: 0 | Status: DISCONTINUED | OUTPATIENT
Start: 2017-02-06 | End: 2017-02-06

## 2017-02-06 RX ORDER — SODIUM CHLORIDE 9 MG/ML
1000 INJECTION, SOLUTION INTRAVENOUS
Qty: 0 | Refills: 0 | Status: DISCONTINUED | OUTPATIENT
Start: 2017-02-06 | End: 2017-02-10

## 2017-02-06 RX ORDER — IPRATROPIUM/ALBUTEROL SULFATE 18-103MCG
3 AEROSOL WITH ADAPTER (GRAM) INHALATION EVERY 6 HOURS
Qty: 0 | Refills: 0 | Status: DISCONTINUED | OUTPATIENT
Start: 2017-02-06 | End: 2017-02-17

## 2017-02-06 RX ADMIN — ALBUTEROL 1 PUFF(S): 90 AEROSOL, METERED ORAL at 04:03

## 2017-02-06 RX ADMIN — Medication 25 MILLILITER(S): at 11:34

## 2017-02-06 RX ADMIN — HEPARIN SODIUM 5000 UNIT(S): 5000 INJECTION INTRAVENOUS; SUBCUTANEOUS at 21:26

## 2017-02-06 RX ADMIN — Medication 3 MILLILITER(S): at 21:51

## 2017-02-06 RX ADMIN — Medication: at 23:59

## 2017-02-06 RX ADMIN — SODIUM CHLORIDE 50 MILLILITER(S): 9 INJECTION, SOLUTION INTRAVENOUS at 19:12

## 2017-02-06 RX ADMIN — Medication 1 PUFF(S): at 10:15

## 2017-02-06 RX ADMIN — HEPARIN SODIUM 5000 UNIT(S): 5000 INJECTION INTRAVENOUS; SUBCUTANEOUS at 13:31

## 2017-02-06 RX ADMIN — PANTOPRAZOLE SODIUM 40 MILLIGRAM(S): 20 TABLET, DELAYED RELEASE ORAL at 13:31

## 2017-02-06 RX ADMIN — Medication 1 PUFF(S): at 04:03

## 2017-02-06 RX ADMIN — Medication 3 MILLILITER(S): at 15:36

## 2017-02-06 RX ADMIN — SODIUM CHLORIDE 50 MILLILITER(S): 9 INJECTION, SOLUTION INTRAVENOUS at 11:34

## 2017-02-06 RX ADMIN — ALBUTEROL 1 PUFF(S): 90 AEROSOL, METERED ORAL at 10:15

## 2017-02-06 RX ADMIN — HEPARIN SODIUM 5000 UNIT(S): 5000 INJECTION INTRAVENOUS; SUBCUTANEOUS at 06:00

## 2017-02-06 RX ADMIN — HUMAN INSULIN 10 UNIT(S): 100 INJECTION, SUSPENSION SUBCUTANEOUS at 06:00

## 2017-02-06 NOTE — AIRWAY REMOVAL NOTE  ADULT & PEDS - ARTIFICAL AIRWAY REMOVAL COMMENTS
extubated to bipap 10/5/40% as per .
extubated to BIPAP per Dr. Atwood with resp therapist at bedside

## 2017-02-07 LAB
BLD GP AB SCN SERPL QL: NEGATIVE — SIGNIFICANT CHANGE UP
BUN SERPL-MCNC: 56 MG/DL — HIGH (ref 7–23)
CALCIUM SERPL-MCNC: 7.9 MG/DL — LOW (ref 8.4–10.5)
CHLORIDE SERPL-SCNC: 99 MMOL/L — SIGNIFICANT CHANGE UP (ref 98–107)
CO2 SERPL-SCNC: 21 MMOL/L — LOW (ref 22–31)
CREAT SERPL-MCNC: 2.7 MG/DL — HIGH (ref 0.5–1.3)
GLUCOSE SERPL-MCNC: 227 MG/DL — HIGH (ref 70–99)
HCT VFR BLD CALC: 22.3 % — LOW (ref 34.5–45)
HGB BLD-MCNC: 6.7 G/DL — CRITICAL LOW (ref 11.5–15.5)
MAGNESIUM SERPL-MCNC: 2 MG/DL — SIGNIFICANT CHANGE UP (ref 1.6–2.6)
MCHC RBC-ENTMCNC: 26.4 PG — LOW (ref 27–34)
MCHC RBC-ENTMCNC: 30 % — LOW (ref 32–36)
MCV RBC AUTO: 87.8 FL — SIGNIFICANT CHANGE UP (ref 80–100)
PHOSPHATE SERPL-MCNC: 4.5 MG/DL — SIGNIFICANT CHANGE UP (ref 2.5–4.5)
PLATELET # BLD AUTO: 305 K/UL — SIGNIFICANT CHANGE UP (ref 150–400)
PMV BLD: 10.1 FL — SIGNIFICANT CHANGE UP (ref 7–13)
POTASSIUM SERPL-MCNC: 3.7 MMOL/L — SIGNIFICANT CHANGE UP (ref 3.5–5.3)
POTASSIUM SERPL-SCNC: 3.7 MMOL/L — SIGNIFICANT CHANGE UP (ref 3.5–5.3)
RBC # BLD: 2.54 M/UL — LOW (ref 3.8–5.2)
RBC # FLD: 14.9 % — HIGH (ref 10.3–14.5)
RH IG SCN BLD-IMP: POSITIVE — SIGNIFICANT CHANGE UP
RH IG SCN BLD-IMP: POSITIVE — SIGNIFICANT CHANGE UP
SODIUM SERPL-SCNC: 141 MMOL/L — SIGNIFICANT CHANGE UP (ref 135–145)
WBC # BLD: 9.98 K/UL — SIGNIFICANT CHANGE UP (ref 3.8–10.5)
WBC # FLD AUTO: 9.98 K/UL — SIGNIFICANT CHANGE UP (ref 3.8–10.5)

## 2017-02-07 PROCEDURE — 99233 SBSQ HOSP IP/OBS HIGH 50: CPT | Mod: GC

## 2017-02-07 RX ORDER — HYDRALAZINE HCL 50 MG
10 TABLET ORAL ONCE
Qty: 0 | Refills: 0 | Status: COMPLETED | OUTPATIENT
Start: 2017-02-07 | End: 2017-02-07

## 2017-02-07 RX ADMIN — HEPARIN SODIUM 5000 UNIT(S): 5000 INJECTION INTRAVENOUS; SUBCUTANEOUS at 22:39

## 2017-02-07 RX ADMIN — Medication 3 MILLILITER(S): at 04:14

## 2017-02-07 RX ADMIN — Medication 3 MILLILITER(S): at 21:08

## 2017-02-07 RX ADMIN — Medication 3 MILLILITER(S): at 16:08

## 2017-02-07 RX ADMIN — Medication 10 MILLIGRAM(S): at 13:37

## 2017-02-07 RX ADMIN — Medication 3 MILLILITER(S): at 09:50

## 2017-02-07 RX ADMIN — SODIUM CHLORIDE 50 MILLILITER(S): 9 INJECTION, SOLUTION INTRAVENOUS at 06:06

## 2017-02-07 RX ADMIN — HEPARIN SODIUM 5000 UNIT(S): 5000 INJECTION INTRAVENOUS; SUBCUTANEOUS at 05:22

## 2017-02-07 RX ADMIN — Medication: at 06:10

## 2017-02-07 RX ADMIN — HEPARIN SODIUM 5000 UNIT(S): 5000 INJECTION INTRAVENOUS; SUBCUTANEOUS at 13:24

## 2017-02-07 NOTE — SWALLOW BEDSIDE ASSESSMENT ADULT - SLP PERTINENT HISTORY OF CURRENT PROBLEM
89 y/o female with dx severe sepsis 2/2 pna c/b hypoxic respiratory failure, s/p intubation/extubation.

## 2017-02-07 NOTE — SWALLOW BEDSIDE ASSESSMENT ADULT - SWALLOW EVAL: REHAB POTENTIAL
fair, will monitor progress closely/will follow to monitor for improved candidacy for PO trials fair, will monitor progress closely/As per discussion with PA/Kylee, patient will be followed to monitor improvement for PO tolerance given that patient was recently extubated. As per discussion with PA/Kylee, patient will be followed by this department to determine improved tolerance for PO trials given that pt was recently extubated and taken off BiPaP./fair, will monitor progress closely

## 2017-02-07 NOTE — SWALLOW BEDSIDE ASSESSMENT ADULT - SWALLOW EVAL: RECOMMENDED DIET
Oral nutrition is contraindicated at this time. Consider temporary non-oral means of nutrition/hydration.

## 2017-02-07 NOTE — SWALLOW BEDSIDE ASSESSMENT ADULT - COMMENTS
Patient was seen for a bedside swallow evaluation at which time she was awake, non-verbal and unable to follow low level commands despite max cues. Nasal canula in place. Congested cough noted at baseline. Patient was extubated yesterday 2/6 and placed on continuous BiPaP. Patient has since been weaned to supplemental O2 via nasal canula at 3L. Patient was seen for a bedside swallow evaluation at which time she was awake, non-verbal and unable to follow low level commands despite max cues. Nasal canula in place. Congested cough noted at baseline. Patient was extubated yesterday 2/6 and placed on continuous BiPaP. Patient has since been weaned to supplemental O2 via nasal canula at 3L. Findings and recommendations of today's assessment were discussed with primary RN/Aissatou and PA/Kylee via spectra #[id number].

## 2017-02-07 NOTE — SWALLOW BEDSIDE ASSESSMENT ADULT - SWALLOW EVAL: DIAGNOSIS
The patient demonstrates severe to profound oropharyngeal dysphagia for 1/2 teaspoon presentations of puree and honey-thick liquid characterized by poor orientation to spoon, absent labial strippage with open mouth posture maintained, absent oral grading, absent bolus collection/transport, and absent swallow trigger, requiring clinician removal of the bolus from the anterior portion of the oral cavity. The patient demonstrates severe to profound oropharyngeal dysphagia superimposed on cognitive impairment. For 1/2 teaspoon presentations of puree and honey-thick liquids, patient demonstrates poor orientation to spoon, absent labial strippage with open mouth posture maintained, absent oral grading, absent bolus collection/transport, and absent swallow trigger, requiring clinician removal of the bolus from the anterior portion of the oral cavity.

## 2017-02-08 LAB
BUN SERPL-MCNC: 29 MG/DL — HIGH (ref 7–23)
CALCIUM SERPL-MCNC: 8.7 MG/DL — SIGNIFICANT CHANGE UP (ref 8.4–10.5)
CHLORIDE SERPL-SCNC: 95 MMOL/L — LOW (ref 98–107)
CO2 SERPL-SCNC: 23 MMOL/L — SIGNIFICANT CHANGE UP (ref 22–31)
CREAT SERPL-MCNC: 1.85 MG/DL — HIGH (ref 0.5–1.3)
GLUCOSE SERPL-MCNC: 202 MG/DL — HIGH (ref 70–99)
HCT VFR BLD CALC: 25.6 % — LOW (ref 34.5–45)
HCV GENTYP BLD NAA+PROBE: 2 — SIGNIFICANT CHANGE UP
HCV RNA SERPL NAA DL=5-ACNC: HIGH IU/ML
HCV RNA SPEC NAA+PROBE-LOG IU: 7.31 LOGIU/ML — HIGH
HGB BLD-MCNC: 7.7 G/DL — LOW (ref 11.5–15.5)
MAGNESIUM SERPL-MCNC: 2 MG/DL — SIGNIFICANT CHANGE UP (ref 1.6–2.6)
MCHC RBC-ENTMCNC: 26.6 PG — LOW (ref 27–34)
MCHC RBC-ENTMCNC: 30.1 % — LOW (ref 32–36)
MCV RBC AUTO: 88.3 FL — SIGNIFICANT CHANGE UP (ref 80–100)
PHOSPHATE SERPL-MCNC: 3.7 MG/DL — SIGNIFICANT CHANGE UP (ref 2.5–4.5)
PLATELET # BLD AUTO: 370 K/UL — SIGNIFICANT CHANGE UP (ref 150–400)
PMV BLD: 10.1 FL — SIGNIFICANT CHANGE UP (ref 7–13)
POTASSIUM SERPL-MCNC: 3.6 MMOL/L — SIGNIFICANT CHANGE UP (ref 3.5–5.3)
POTASSIUM SERPL-SCNC: 3.6 MMOL/L — SIGNIFICANT CHANGE UP (ref 3.5–5.3)
RBC # BLD: 2.9 M/UL — LOW (ref 3.8–5.2)
RBC # FLD: 15 % — HIGH (ref 10.3–14.5)
SODIUM SERPL-SCNC: 141 MMOL/L — SIGNIFICANT CHANGE UP (ref 135–145)
WBC # BLD: 13.96 K/UL — HIGH (ref 3.8–10.5)
WBC # FLD AUTO: 13.96 K/UL — HIGH (ref 3.8–10.5)

## 2017-02-08 PROCEDURE — 99233 SBSQ HOSP IP/OBS HIGH 50: CPT | Mod: GC

## 2017-02-08 PROCEDURE — 71010: CPT | Mod: 26

## 2017-02-08 RX ORDER — HYDRALAZINE HCL 50 MG
10 TABLET ORAL ONCE
Qty: 0 | Refills: 0 | Status: COMPLETED | OUTPATIENT
Start: 2017-02-08 | End: 2017-02-08

## 2017-02-08 RX ORDER — VERAPAMIL HCL 240 MG
40 CAPSULE, EXTENDED RELEASE PELLETS 24 HR ORAL EVERY 8 HOURS
Qty: 0 | Refills: 0 | Status: DISCONTINUED | OUTPATIENT
Start: 2017-02-08 | End: 2017-02-12

## 2017-02-08 RX ADMIN — Medication 3 MILLILITER(S): at 11:35

## 2017-02-08 RX ADMIN — Medication 3 MILLILITER(S): at 17:13

## 2017-02-08 RX ADMIN — HEPARIN SODIUM 5000 UNIT(S): 5000 INJECTION INTRAVENOUS; SUBCUTANEOUS at 14:56

## 2017-02-08 RX ADMIN — Medication 4: at 00:08

## 2017-02-08 RX ADMIN — SODIUM CHLORIDE 50 MILLILITER(S): 9 INJECTION, SOLUTION INTRAVENOUS at 10:31

## 2017-02-08 RX ADMIN — HEPARIN SODIUM 5000 UNIT(S): 5000 INJECTION INTRAVENOUS; SUBCUTANEOUS at 22:00

## 2017-02-08 RX ADMIN — Medication 3 MILLILITER(S): at 04:23

## 2017-02-08 RX ADMIN — Medication 4: at 06:56

## 2017-02-08 RX ADMIN — Medication 10 MILLIGRAM(S): at 06:54

## 2017-02-08 RX ADMIN — Medication 3 MILLILITER(S): at 21:58

## 2017-02-08 RX ADMIN — Medication 2: at 17:43

## 2017-02-08 RX ADMIN — HEPARIN SODIUM 5000 UNIT(S): 5000 INJECTION INTRAVENOUS; SUBCUTANEOUS at 05:04

## 2017-02-08 RX ADMIN — Medication 4: at 12:18

## 2017-02-08 NOTE — PHYSICAL THERAPY INITIAL EVALUATION ADULT - PERTINENT HX OF CURRENT PROBLEM, REHAB EVAL
89 y/o F hx T2DM, HTN, HLD. Pt one week ago was having productive cough, and she went to outpat PCP, where she was diagnosed bronchitis and given azithromycin. Pt did not improve with azithromycin and for the past 3 days, acutely worse with SOB, and productive yellow sputum. Pt also was more confused and lethargic per daughter.

## 2017-02-08 NOTE — PHYSICAL THERAPY INITIAL EVALUATION ADULT - ADDITIONAL COMMENTS
Patient not answering questions about history in Creole or in English; patient not following commands

## 2017-02-09 LAB
ANISOCYTOSIS BLD QL: SLIGHT — SIGNIFICANT CHANGE UP
BASOPHILS # BLD AUTO: 0.02 K/UL — SIGNIFICANT CHANGE UP (ref 0–0.2)
BASOPHILS NFR BLD AUTO: 0.2 % — SIGNIFICANT CHANGE UP (ref 0–2)
BLD GP AB SCN SERPL QL: NEGATIVE — SIGNIFICANT CHANGE UP
BUN SERPL-MCNC: 38 MG/DL — HIGH (ref 7–23)
CALCIUM SERPL-MCNC: 8.4 MG/DL — SIGNIFICANT CHANGE UP (ref 8.4–10.5)
CHLORIDE SERPL-SCNC: 93 MMOL/L — LOW (ref 98–107)
CO2 SERPL-SCNC: 23 MMOL/L — SIGNIFICANT CHANGE UP (ref 22–31)
CREAT SERPL-MCNC: 2.68 MG/DL — HIGH (ref 0.5–1.3)
EOSINOPHIL # BLD AUTO: 0 K/UL — SIGNIFICANT CHANGE UP (ref 0–0.5)
EOSINOPHIL NFR BLD AUTO: 0 % — SIGNIFICANT CHANGE UP (ref 0–6)
GLUCOSE SERPL-MCNC: 195 MG/DL — HIGH (ref 70–99)
HCT VFR BLD CALC: 22.8 % — LOW (ref 34.5–45)
HCT VFR BLD CALC: 26 % — LOW (ref 34.5–45)
HGB BLD-MCNC: 7 G/DL — CRITICAL LOW (ref 11.5–15.5)
HGB BLD-MCNC: 7.5 G/DL — LOW (ref 11.5–15.5)
HYPOCHROMIA BLD QL: SLIGHT — SIGNIFICANT CHANGE UP
IMM GRANULOCYTES NFR BLD AUTO: 0.6 % — SIGNIFICANT CHANGE UP (ref 0–1.5)
LYMPHOCYTES # BLD AUTO: 0.43 K/UL — LOW (ref 1–3.3)
LYMPHOCYTES # BLD AUTO: 4.7 % — LOW (ref 13–44)
MAGNESIUM SERPL-MCNC: 1.9 MG/DL — SIGNIFICANT CHANGE UP (ref 1.6–2.6)
MANUAL SMEAR VERIFICATION: SIGNIFICANT CHANGE UP
MCHC RBC-ENTMCNC: 26.3 PG — LOW (ref 27–34)
MCHC RBC-ENTMCNC: 27 PG — SIGNIFICANT CHANGE UP (ref 27–34)
MCHC RBC-ENTMCNC: 28.8 % — LOW (ref 32–36)
MCHC RBC-ENTMCNC: 30.7 % — LOW (ref 32–36)
MCV RBC AUTO: 88 FL — SIGNIFICANT CHANGE UP (ref 80–100)
MCV RBC AUTO: 91.2 FL — SIGNIFICANT CHANGE UP (ref 80–100)
MONOCYTES # BLD AUTO: 0.83 K/UL — SIGNIFICANT CHANGE UP (ref 0–0.9)
MONOCYTES NFR BLD AUTO: 9 % — SIGNIFICANT CHANGE UP (ref 2–14)
NEUTROPHILS # BLD AUTO: 7.9 K/UL — HIGH (ref 1.8–7.4)
NEUTROPHILS NFR BLD AUTO: 85.5 % — HIGH (ref 43–77)
OB PNL STL: NEGATIVE — SIGNIFICANT CHANGE UP
PHOSPHATE SERPL-MCNC: 4.9 MG/DL — HIGH (ref 2.5–4.5)
PLATELET # BLD AUTO: 285 K/UL — SIGNIFICANT CHANGE UP (ref 150–400)
PLATELET # BLD AUTO: 322 K/UL — SIGNIFICANT CHANGE UP (ref 150–400)
PLATELET COUNT - ESTIMATE: NORMAL — SIGNIFICANT CHANGE UP
PMV BLD: 10.1 FL — SIGNIFICANT CHANGE UP (ref 7–13)
PMV BLD: 9.6 FL — SIGNIFICANT CHANGE UP (ref 7–13)
POTASSIUM SERPL-MCNC: 4.1 MMOL/L — SIGNIFICANT CHANGE UP (ref 3.5–5.3)
POTASSIUM SERPL-SCNC: 4.1 MMOL/L — SIGNIFICANT CHANGE UP (ref 3.5–5.3)
RBC # BLD: 2.59 M/UL — LOW (ref 3.8–5.2)
RBC # BLD: 2.85 M/UL — LOW (ref 3.8–5.2)
RBC # FLD: 15.2 % — HIGH (ref 10.3–14.5)
RBC # FLD: 15.4 % — HIGH (ref 10.3–14.5)
RH IG SCN BLD-IMP: POSITIVE — SIGNIFICANT CHANGE UP
SODIUM SERPL-SCNC: 139 MMOL/L — SIGNIFICANT CHANGE UP (ref 135–145)
WBC # BLD: 9.24 K/UL — SIGNIFICANT CHANGE UP (ref 3.8–10.5)
WBC # BLD: 9.76 K/UL — SIGNIFICANT CHANGE UP (ref 3.8–10.5)
WBC # FLD AUTO: 9.24 K/UL — SIGNIFICANT CHANGE UP (ref 3.8–10.5)
WBC # FLD AUTO: 9.76 K/UL — SIGNIFICANT CHANGE UP (ref 3.8–10.5)

## 2017-02-09 PROCEDURE — 99233 SBSQ HOSP IP/OBS HIGH 50: CPT | Mod: GC

## 2017-02-09 PROCEDURE — 71010: CPT | Mod: 26

## 2017-02-09 RX ORDER — HALOPERIDOL DECANOATE 100 MG/ML
1 INJECTION INTRAMUSCULAR ONCE
Qty: 0 | Refills: 0 | Status: COMPLETED | OUTPATIENT
Start: 2017-02-09 | End: 2017-02-09

## 2017-02-09 RX ADMIN — HEPARIN SODIUM 5000 UNIT(S): 5000 INJECTION INTRAVENOUS; SUBCUTANEOUS at 21:55

## 2017-02-09 RX ADMIN — Medication 3 MILLILITER(S): at 04:00

## 2017-02-09 RX ADMIN — Medication 4: at 01:29

## 2017-02-09 RX ADMIN — Medication 4: at 18:28

## 2017-02-09 RX ADMIN — Medication: at 12:45

## 2017-02-09 RX ADMIN — Medication 2: at 06:42

## 2017-02-09 RX ADMIN — HALOPERIDOL DECANOATE 1 MILLIGRAM(S): 100 INJECTION INTRAMUSCULAR at 21:55

## 2017-02-09 RX ADMIN — HEPARIN SODIUM 5000 UNIT(S): 5000 INJECTION INTRAVENOUS; SUBCUTANEOUS at 06:41

## 2017-02-09 RX ADMIN — Medication 3 MILLILITER(S): at 16:03

## 2017-02-09 RX ADMIN — SODIUM CHLORIDE 50 MILLILITER(S): 9 INJECTION, SOLUTION INTRAVENOUS at 21:55

## 2017-02-09 RX ADMIN — SODIUM CHLORIDE 50 MILLILITER(S): 9 INJECTION, SOLUTION INTRAVENOUS at 06:39

## 2017-02-09 RX ADMIN — HEPARIN SODIUM 5000 UNIT(S): 5000 INJECTION INTRAVENOUS; SUBCUTANEOUS at 15:10

## 2017-02-09 RX ADMIN — Medication 3 MILLILITER(S): at 21:27

## 2017-02-09 RX ADMIN — Medication 3 MILLILITER(S): at 09:57

## 2017-02-09 NOTE — DISCHARGE NOTE ADULT - HOSPITAL COURSE
89F PMH DM2, HTN, HLD, presented to ED with shortness of breath. Admitted with hypoxic respiratory failure, given a trial of BIPAP but continued to deteriorate, requiring intubation and MICU admission. Treated for aspiration pneumonia with improvement. Successfully extubated to PRN BiPAP and supplemental nasal O2 as tolerated  Found to have BENJI on admission, hemodynamically mediated in the setting of sepsis and hypotension. Renal consulted and pt received short term course of hemodialysis vis right IJ shiley cath. Kidney function stabilized and dialysis d/c'd.   Failed bedside swallow eval. FEEST_____  Diabetes controlled with corrective scale humalog.  HTN managed with IV hydralazine while NPO, _______________________ 89F PMH DM2, HTN, HLD, presented to ED with shortness of breath. Admitted with hypoxic respiratory failure, given a trial of BIPAP but continued to deteriorate, requiring intubation and MICU admission. Treated for aspiration pneumonia with improvement. Successfully extubated to PRN BiPAP and supplemental nasal O2 as tolerated  Found to have BENJI on admission, hemodynamically mediated in the setting of sepsis and hypotension. Renal consulted and pt received short term course of hemodialysis vis right IJ shiley cath. Kidney function stabilized and dialysis d/c'd.   Failed bedside swallow eval. Cine passed with dysphagia 1 diet  Diabetes controlled with corrective scale humalog.  HTN managed - BP improved.  PT recs rehab    dispo: to rehab 89F PMH DM2, HTN, HLD, presented to ED with shortness of breath. Admitted with hypoxic respiratory failure, given a trial of BIPAP but continued to deteriorate, requiring intubation and MICU admission. Treated for aspiration pneumonia with improvement. Successfully extubated to PRN BiPAP and supplemental nasal O2 as tolerated  Found to have BENJI on admission, hemodynamically mediated in the setting of sepsis and hypotension. Renal consulted and pt received short term course of hemodialysis via right IJ shiley cath. Kidney function stabilized and dialysis d/c'd. Shiley removed.   Failed bedside swallow eval. Cine passed with dysphagia 1 diet and honey thick fluids to be teaspoon fed only  Diabetes controlled with premeal and  corrective scale humalog, and lantus.   HTN controlled with oral meds - BP improved.  PT recs rehab    dispo: to rehab

## 2017-02-09 NOTE — DISCHARGE NOTE ADULT - PATIENT PORTAL LINK FT
“You can access the [employer] Patient Portal, offered by [facility], by registering with the following website: [email or web address]”

## 2017-02-09 NOTE — DISCHARGE NOTE ADULT - INSTRUCTIONS
Dysphagia 1 with honey consistency liquid  Low salt, low fat  Consistent carbohydrates Dysphagia 1 with honey consistency liquid-- assist with all feeding and administer honey thick liquids BY TEASPOON.  Do not allow patient to drink from a cup  Low salt, low fat, carbohydrate consistent  Consistent carbohydrates

## 2017-02-09 NOTE — DISCHARGE NOTE ADULT - CARE PROVIDER_API CALL
David,   Phone: (   )    -  Fax: (   )    - David,   Phone: (   )    -  Fax: (   )    -    Lisker, Gita N (MD), Critical Care Medicine  [address] [place] [address]  Phone: [phone]  Fax: [phone]    Jose Hudson), Nephrology  [address] [place] [id number]  Phone: [phone]  Fax: [phone]

## 2017-02-09 NOTE — DISCHARGE NOTE ADULT - MEDICATION SUMMARY - MEDICATIONS TO TAKE
I will START or STAY ON the medications listed below when I get home from the hospital:    verapamil 40 mg oral tablet  -- 1 tab(s) by mouth every 8 hours  -- Indication: For BENJI (acute kidney injury)    insulin lispro 100 units/mL subcutaneous solution  --  subcutaneous 3 times a day (before meals); 2 Unit(s) if Glucose 151 - 200  4 Unit(s) if Glucose 201 - 250  6 Unit(s) if Glucose 251 - 300  8 Unit(s) if Glucose 301 - 350  10 Unit(s) if Glucose 351 - 400  12 Unit(s) if Glucose Greater Than 400  -- Indication: For DM (diabetes mellitus)    insulin lispro 100 units/mL subcutaneous solution  --  subcutaneous once a day (at bedtime); 0 Unit(s) if Glucose 61 - 250  2 Unit(s) if Glucose 251 - 300  4 Unit(s) if Glucose 301 - 350  6 Unit(s) if Glucose 351 - 400  8 Unit(s) if Glucose Greater Than 400  -- Indication: For DM (diabetes mellitus)    metoprolol tartrate 25 mg oral tablet  -- 1 tab(s) by mouth 2 times a day  -- Indication: For HTN (hypertension)    albuterol-ipratropium 2.5 mg-0.5 mg/3 mL inhalation solution  -- 3 milliliter(s) inhaled every 6 hours, As Needed  -- Indication: For Acute respiratory failure with hypoxia    amLODIPine 10 mg oral tablet  -- 1 tab(s) by mouth once a day  -- Indication: For Essential hypertension    Vitamin D2 50,000 intl units (1.25 mg) oral capsule  -- 1 cap(s) by mouth once a week  -- Indication: For Need for prophylactic measure I will START or STAY ON the medications listed below when I get home from the hospital:    verapamil 40 mg oral tablet  -- 1 tab(s) by mouth every 8 hours  -- Indication: For BENJI (acute kidney injury)    insulin lispro 100 units/mL subcutaneous solution  -- 3 unit(s) subcutaneous 3 times a day (before meals)  -- Indication: For DM (diabetes mellitus)    insulin glargine  -- 10 unit(s) subcutaneously once a day (at bedtime)  -- Indication: For DM (diabetes mellitus)    insulin lispro 100 units/mL subcutaneous solution  --  subcutaneous 3 times a day (before meals); 2 Unit(s) if Glucose 151 - 200  4 Unit(s) if Glucose 201 - 250  6 Unit(s) if Glucose 251 - 300  8 Unit(s) if Glucose 301 - 350  10 Unit(s) if Glucose 351 - 400  12 Unit(s) if Glucose Greater Than 400  -- Indication: For DM (diabetes mellitus)    insulin lispro 100 units/mL subcutaneous solution  --  subcutaneous once a day (at bedtime); 0 Unit(s) if Glucose 61 - 250  2 Unit(s) if Glucose 251 - 300  4 Unit(s) if Glucose 301 - 350  6 Unit(s) if Glucose 351 - 400  8 Unit(s) if Glucose Greater Than 400  -- Indication: For DM (diabetes mellitus)    metoprolol tartrate 25 mg oral tablet  -- 1 tab(s) by mouth 2 times a day  -- Indication: For HTN (hypertension)    albuterol-ipratropium 2.5 mg-0.5 mg/3 mL inhalation solution  -- 3 milliliter(s) inhaled every 6 hours, As Needed  -- Indication: For Acute respiratory failure with hypoxia    amLODIPine 10 mg oral tablet  -- 1 tab(s) by mouth once a day  -- Indication: For Essential hypertension    Vitamin D2 50,000 intl units (1.25 mg) oral capsule  -- 1 cap(s) by mouth once a week  -- Indication: For Need for prophylactic measure I will START or STAY ON the medications listed below when I get home from the hospital:    verapamil 40 mg oral tablet  -- 1 tab(s) by mouth every 8 hours  -- Indication: For BENJI (acute kidney injury)    insulin glargine  -- 5 unit(s) subcutaneously once a day (at bedtime)  -- Indication: For DM (diabetes mellitus)    insulin lispro 100 units/mL subcutaneous solution  --  subcutaneous 3 times a day (before meals); 2 Unit(s) if Glucose 151 - 200  4 Unit(s) if Glucose 201 - 250  6 Unit(s) if Glucose 251 - 300  8 Unit(s) if Glucose 301 - 350  10 Unit(s) if Glucose 351 - 400  12 Unit(s) if Glucose Greater Than 400  -- Indication: For DM (diabetes mellitus)    insulin lispro 100 units/mL subcutaneous solution  --  subcutaneous once a day (at bedtime); 0 Unit(s) if Glucose 61 - 250  2 Unit(s) if Glucose 251 - 300  4 Unit(s) if Glucose 301 - 350  6 Unit(s) if Glucose 351 - 400  8 Unit(s) if Glucose Greater Than 400  -- Indication: For DM (diabetes mellitus)    metoprolol tartrate 25 mg oral tablet  -- 1 tab(s) by mouth 2 times a day  -- Indication: For HTN (hypertension)    albuterol-ipratropium 2.5 mg-0.5 mg/3 mL inhalation solution  -- 3 milliliter(s) inhaled every 6 hours, As Needed  -- Indication: For Acute respiratory failure with hypoxia    amLODIPine 10 mg oral tablet  -- 1 tab(s) by mouth once a day  -- Indication: For Essential hypertension    Vitamin D2 50,000 intl units (1.25 mg) oral capsule  -- 1 cap(s) by mouth once a week  -- Indication: For Need for prophylactic measure

## 2017-02-09 NOTE — DISCHARGE NOTE ADULT - ADDITIONAL INSTRUCTIONS
Follow up with provider from [facility] on arrival  Follow up with your primary care provider on discharge from rehab

## 2017-02-09 NOTE — DISCHARGE NOTE ADULT - MEDICATION SUMMARY - MEDICATIONS TO CHANGE
I will SWITCH the dose or number of times a day I take the medications listed below when I get home from the hospital:    verapamil 240 mg/24 hours oral capsule, extended release  -- 1 cap(s) by mouth once a day

## 2017-02-09 NOTE — DISCHARGE NOTE ADULT - PLAN OF CARE
Resolved Pt was treated for respiratory failure due to pneumonia. She completed a course of antibiotics.   Follow up with medical doctor upon discharge Normal kidney function Blood glucose 100-180 blood pressure within normal limits Continue dysphagia diet. Patient should be fed with teaspoonfuls only, with time in between for swallowing. You required dialysis for a short time while in the hospital.  You no longer need dialysis, and your kidney function has become stable and returned to your baseline.  Please continue to monitor urine output. Continue medications as directed.  Monitor fingersticks 4x daily.  Maintain consistent carbohydrate diet Continue medications as directed.  Monitor blood pressure. You was treated for respiratory failure due to pneumonia. You completed a course of antibiotics.   Follow up with medical doctor upon discharge You required dialysis for a short time while in the hospital.  You no longer need dialysis, and your kidney function has become stable and returned to your baseline.  Please continue to monitor urine output. Please check creatinine weekly at rehab to monitor. Last creatinine 1.94 on day of discharge. Follow up with nephrologist within 1-2 weeks. Call to arrange an appointment with Dr. Hudson  Continue medications as directed.  Monitor fingersticks 4x daily.   Maintain consistent carbohydrate diet You was treated for respiratory failure due to pneumonia. You completed a course of antibiotics.   Follow up with medical doctor and pulmonologist upon discharge from rehab.  Continue to use BiPAP at night settings 10/5 FiO2 40%

## 2017-02-09 NOTE — DISCHARGE NOTE ADULT - CARE PLAN
Principal Discharge DX:	Acute respiratory failure with hypoxia  Goal:	Resolved  Instructions for follow-up, activity and diet:	Pt was treated for respiratory failure due to pneumonia. She completed a course of antibiotics.   Follow up with medical doctor upon discharge  Secondary Diagnosis:	BENJI (acute kidney injury)  Goal:	Normal kidney function  Secondary Diagnosis:	DM (diabetes mellitus)  Goal:	Blood glucose 100-180  Secondary Diagnosis:	Essential hypertension  Goal:	blood pressure within normal limits  Secondary Diagnosis:	Dysphagia Principal Discharge DX:	Acute respiratory failure with hypoxia  Goal:	Resolved  Instructions for follow-up, activity and diet:	You was treated for respiratory failure due to pneumonia. You completed a course of antibiotics.   Follow up with medical doctor upon discharge  Secondary Diagnosis:	BENJI (acute kidney injury)  Goal:	Normal kidney function  Instructions for follow-up, activity and diet:	You required dialysis for a short time while in the hospital.  You no longer need dialysis, and your kidney function has become stable and returned to your baseline.  Please continue to monitor urine output.  Secondary Diagnosis:	DM (diabetes mellitus)  Goal:	Blood glucose 100-180  Instructions for follow-up, activity and diet:	Continue medications as directed.  Monitor fingersticks 4x daily.  Maintain consistent carbohydrate diet  Secondary Diagnosis:	Essential hypertension  Goal:	blood pressure within normal limits  Instructions for follow-up, activity and diet:	Continue medications as directed.  Monitor blood pressure.  Secondary Diagnosis:	Dysphagia  Instructions for follow-up, activity and diet:	Continue dysphagia diet. Patient should be fed with teaspoonfuls only, with time in between for swallowing. Principal Discharge DX:	Acute respiratory failure with hypoxia  Goal:	Resolved  Instructions for follow-up, activity and diet:	You was treated for respiratory failure due to pneumonia. You completed a course of antibiotics.   Follow up with medical doctor and pulmonologist upon discharge from rehab.  Continue to use BiPAP at night settings 10/5 FiO2 40%  Secondary Diagnosis:	BENJI (acute kidney injury)  Goal:	Normal kidney function  Instructions for follow-up, activity and diet:	You required dialysis for a short time while in the hospital.  You no longer need dialysis, and your kidney function has become stable and returned to your baseline.  Please continue to monitor urine output. Please check creatinine weekly at rehab to monitor. Last creatinine 1.94 on day of discharge. Follow up with nephrologist within 1-2 weeks. Call to arrange an appointment with Dr. Hudson   Secondary Diagnosis:	DM (diabetes mellitus)  Goal:	Blood glucose 100-180  Instructions for follow-up, activity and diet:	Continue medications as directed.  Monitor fingersticks 4x daily.   Maintain consistent carbohydrate diet  Secondary Diagnosis:	Essential hypertension  Goal:	blood pressure within normal limits  Instructions for follow-up, activity and diet:	Continue medications as directed.  Monitor blood pressure.  Secondary Diagnosis:	Dysphagia  Instructions for follow-up, activity and diet:	Continue dysphagia diet. Patient should be fed with teaspoonfuls only, with time in between for swallowing.

## 2017-02-09 NOTE — DISCHARGE NOTE ADULT - PROVIDER TOKENS
FREE:[LAST:[David],PHONE:[(   )    -],FAX:[(   )    -]] FREE:[LAST:[David],PHONE:[(   )    -],FAX:[(   )    -]],TOKEN:'[id number]:'[id number]'

## 2017-02-09 NOTE — DISCHARGE NOTE ADULT - MEDICATION SUMMARY - MEDICATIONS TO STOP TAKING
I will STOP taking the medications listed below when I get home from the hospital:    lisinopril 20 mg oral tablet  -- 1 tab(s) by mouth two times a day    GlipiZIDE XL 10 mg oral tablet, extended release  -- 1 tab(s) by mouth once a day

## 2017-02-09 NOTE — DISCHARGE NOTE ADULT - CARE PROVIDERS DIRECT ADDRESSES
,DirectAddress_Unknown,[email or web address] ,DirectAddress_Unknown,[email or web address]

## 2017-02-10 LAB
ALBUMIN SERPL ELPH-MCNC: 2.2 G/DL — LOW (ref 3.3–5)
ALP SERPL-CCNC: 75 U/L — SIGNIFICANT CHANGE UP (ref 40–120)
ALT FLD-CCNC: 12 U/L — SIGNIFICANT CHANGE UP (ref 4–33)
AST SERPL-CCNC: 19 U/L — SIGNIFICANT CHANGE UP (ref 4–32)
BILIRUB SERPL-MCNC: 0.5 MG/DL — SIGNIFICANT CHANGE UP (ref 0.2–1.2)
BUN SERPL-MCNC: 39 MG/DL — HIGH (ref 7–23)
CALCIUM SERPL-MCNC: 7.8 MG/DL — LOW (ref 8.4–10.5)
CHLORIDE SERPL-SCNC: 95 MMOL/L — LOW (ref 98–107)
CO2 SERPL-SCNC: 25 MMOL/L — SIGNIFICANT CHANGE UP (ref 22–31)
CREAT SERPL-MCNC: 2.77 MG/DL — HIGH (ref 0.5–1.3)
GLUCOSE SERPL-MCNC: 145 MG/DL — HIGH (ref 70–99)
HCT VFR BLD CALC: 21.1 % — LOW (ref 34.5–45)
HGB BLD-MCNC: 6.5 G/DL — CRITICAL LOW (ref 11.5–15.5)
MAGNESIUM SERPL-MCNC: 1.8 MG/DL — SIGNIFICANT CHANGE UP (ref 1.6–2.6)
MCHC RBC-ENTMCNC: 27.1 PG — SIGNIFICANT CHANGE UP (ref 27–34)
MCHC RBC-ENTMCNC: 30.8 % — LOW (ref 32–36)
MCV RBC AUTO: 87.9 FL — SIGNIFICANT CHANGE UP (ref 80–100)
PHOSPHATE SERPL-MCNC: 4.9 MG/DL — HIGH (ref 2.5–4.5)
PLATELET # BLD AUTO: 257 K/UL — SIGNIFICANT CHANGE UP (ref 150–400)
PMV BLD: 9.6 FL — SIGNIFICANT CHANGE UP (ref 7–13)
POTASSIUM SERPL-MCNC: 3.8 MMOL/L — SIGNIFICANT CHANGE UP (ref 3.5–5.3)
POTASSIUM SERPL-SCNC: 3.8 MMOL/L — SIGNIFICANT CHANGE UP (ref 3.5–5.3)
PROT SERPL-MCNC: 5.4 G/DL — LOW (ref 6–8.3)
RBC # BLD: 2.4 M/UL — LOW (ref 3.8–5.2)
RBC # FLD: 14.9 % — HIGH (ref 10.3–14.5)
SODIUM SERPL-SCNC: 135 MMOL/L — SIGNIFICANT CHANGE UP (ref 135–145)
WBC # BLD: 6.89 K/UL — SIGNIFICANT CHANGE UP (ref 3.8–10.5)
WBC # FLD AUTO: 6.89 K/UL — SIGNIFICANT CHANGE UP (ref 3.8–10.5)

## 2017-02-10 PROCEDURE — 99233 SBSQ HOSP IP/OBS HIGH 50: CPT | Mod: GC

## 2017-02-10 RX ORDER — INSULIN LISPRO 100/ML
VIAL (ML) SUBCUTANEOUS
Qty: 0 | Refills: 0 | Status: DISCONTINUED | OUTPATIENT
Start: 2017-02-10 | End: 2017-02-14

## 2017-02-10 RX ORDER — INSULIN LISPRO 100/ML
VIAL (ML) SUBCUTANEOUS AT BEDTIME
Qty: 0 | Refills: 0 | Status: DISCONTINUED | OUTPATIENT
Start: 2017-02-10 | End: 2017-02-14

## 2017-02-10 RX ADMIN — Medication 2: at 00:14

## 2017-02-10 RX ADMIN — Medication 3 MILLILITER(S): at 21:54

## 2017-02-10 RX ADMIN — Medication 40 MILLIGRAM(S): at 21:21

## 2017-02-10 RX ADMIN — Medication 3 MILLILITER(S): at 03:10

## 2017-02-10 RX ADMIN — HEPARIN SODIUM 5000 UNIT(S): 5000 INJECTION INTRAVENOUS; SUBCUTANEOUS at 13:36

## 2017-02-10 RX ADMIN — Medication 40 MILLIGRAM(S): at 13:35

## 2017-02-10 RX ADMIN — HEPARIN SODIUM 5000 UNIT(S): 5000 INJECTION INTRAVENOUS; SUBCUTANEOUS at 05:22

## 2017-02-10 RX ADMIN — Medication 2: at 17:35

## 2017-02-10 RX ADMIN — Medication 3 MILLILITER(S): at 09:45

## 2017-02-10 RX ADMIN — HEPARIN SODIUM 5000 UNIT(S): 5000 INJECTION INTRAVENOUS; SUBCUTANEOUS at 21:20

## 2017-02-10 RX ADMIN — Medication 3 MILLILITER(S): at 16:04

## 2017-02-10 RX ADMIN — SODIUM CHLORIDE 50 MILLILITER(S): 9 INJECTION, SOLUTION INTRAVENOUS at 13:39

## 2017-02-10 RX ADMIN — Medication 4: at 13:36

## 2017-02-10 NOTE — SWALLOW BEDSIDE ASSESSMENT ADULT - SWALLOW EVAL: RECOMMENDED FEEDING/EATING TECHNIQUES
crush medication (when feasible)/no straws/position upright (90 degrees)/allow for swallow between intakes/maintain upright posture during/after eating for 30 mins/small sips/bites

## 2017-02-10 NOTE — SWALLOW BEDSIDE ASSESSMENT ADULT - NS SPL SWALLOW CLINIC TRIAL FT
Patient noted with reflexive wet cough when self-administering honey-thick liquids via consecutive cup sips. Patient without clinical evidence of laryngeal penetration/aspiration when administered controlled teaspoon amounts of honey-thick liquids via clinician assist. Patient noted with reflexive wet cough when self-administering honey-thick liquids via cup sips. Patient without clinical evidence of laryngeal penetration/aspiration when administered controlled teaspoon amounts of honey-thick liquids via clinician assist.

## 2017-02-10 NOTE — SWALLOW BEDSIDE ASSESSMENT ADULT - COMMENTS
Patient was seen for a bedside swallow evaluation at which time she was awake, alert, cooperative and minimally verbal. High flow O2 via venti mask in place which was weaned to nasal canula for the purpose of this evaluation via PA Paulina) assist. Patient's niece at bedside. Patient was seen for a bedside swallow evaluation at which time she was awake, alert, cooperative and minimally verbal. High flow O2 via venti mask in place which was weaned to nasal canula for the purpose of this evaluation via INA Gallardo) assist. Findings and recommendations were discussed with the patient's niece and PA who were both present for the duration of this assessment. Patient was seen for a bedside swallow evaluation at which time she was awake, lethargic and minimally verbal. High flow O2 via venti mask in place which was weaned to nasal canula for the purpose of this evaluation via PA (Donna) assist. Findings and recommendations were discussed with the patient's niece and PA who were both present for the duration of this assessment. Patient was seen for a bedside swallow evaluation at which time she was awake, lethargic and minimally verbal. BiPap initially in place which was weaned to nasal canula for the purpose of this evaluation via PA (Donna) assist. SP O2 levels remained at 96% throughout this assessment. Findings and recommendations were discussed with the patient's niece and PA who were both present at bedside.

## 2017-02-10 NOTE — SWALLOW BEDSIDE ASSESSMENT ADULT - SWALLOW EVAL: DIAGNOSIS
The patient demonstrates mild oropharyngeal dysphagia for puree and honey-thick liquids administered via controlled teaspoon amounts characterized by reduced labial strippage from spoon, adequate bolus formation, delayed oral transit, delayed swallow trigger, reduced hyolaryngeal elevation/excursion upon palpation, and no clinical evidence of laryngeal penetration/aspiration. Patient was also trialed cup sips of honey-thick liquid via self-administration during which patient exhibited a wet reflexive cough suggestive of laryngeal penetration/aspiration. The patient demonstrates mild oropharyngeal dysphagia for puree and honey-thick liquids administered via controlled teaspoon amounts characterized by reduced labial strippage from spoon, delayed bolus formation and oral transit, delayed swallow trigger, reduced hyolaryngeal elevation/excursion upon palpation, and no clinical evidence of laryngeal penetration/aspiration. Patient was also trialed cup sips of honey-thick via self-administration during which patient exhibited a wet reflexive cough suggestive of laryngeal penetration/aspiration. The patient demonstrates mild oropharyngeal dysphagia for puree and honey-thick liquids administered via controlled teaspoon amounts characterized by reduced labial strippage from spoon (which improved with subsequent trials), delayed bolus formation and oral transit, delayed swallow trigger, reduced hyolaryngeal elevation/excursion upon palpation, and no clinical evidence of laryngeal penetration/aspiration. Patient was also trialed cup sips of honey-thick via self-administration during which patient exhibited a wet reflexive cough suggestive of laryngeal penetration/aspiration.

## 2017-02-10 NOTE — SWALLOW BEDSIDE ASSESSMENT ADULT - SWALLOW EVAL: RECOMMENDED DIET
Dysphagia I (Puree) with Honey-Thick Liquids via TEASPOON ONLY 1) Dysphagia I (Puree) with Honey-Thick Liquids via TEASPOON ONLY; 2) Full feeding assistance

## 2017-02-10 NOTE — SWALLOW BEDSIDE ASSESSMENT ADULT - ASR SWALLOW LINGUAL MOBILITY
impaired left lateral movement/impaired protrusion/impaired anterior elevation/impaired right lateral movement

## 2017-02-10 NOTE — SWALLOW BEDSIDE ASSESSMENT ADULT - ASR SWALLOW ASPIRATION MONITOR
change of breathing pattern/pneumonia/gurgly voice/upper respiratory infection/oral hygiene/throat clearing/fever/cough/position upright (90Y)
fever/upper respiratory infection/pneumonia/oral hygiene/cough/position upright (90Y)/throat clearing/gurgly voice/change of breathing pattern

## 2017-02-11 LAB
ALBUMIN SERPL ELPH-MCNC: 2.2 G/DL — LOW (ref 3.3–5)
ALP SERPL-CCNC: 80 U/L — SIGNIFICANT CHANGE UP (ref 40–120)
ALT FLD-CCNC: 16 U/L — SIGNIFICANT CHANGE UP (ref 4–33)
AST SERPL-CCNC: 19 U/L — SIGNIFICANT CHANGE UP (ref 4–32)
BILIRUB SERPL-MCNC: 0.7 MG/DL — SIGNIFICANT CHANGE UP (ref 0.2–1.2)
BUN SERPL-MCNC: 44 MG/DL — HIGH (ref 7–23)
CALCIUM SERPL-MCNC: 8 MG/DL — LOW (ref 8.4–10.5)
CHLORIDE SERPL-SCNC: 95 MMOL/L — LOW (ref 98–107)
CO2 SERPL-SCNC: 25 MMOL/L — SIGNIFICANT CHANGE UP (ref 22–31)
CREAT SERPL-MCNC: 2.84 MG/DL — HIGH (ref 0.5–1.3)
GLUCOSE SERPL-MCNC: 172 MG/DL — HIGH (ref 70–99)
HCT VFR BLD CALC: 25.8 % — LOW (ref 34.5–45)
HCT VFR BLD CALC: 27.6 % — LOW (ref 34.5–45)
HGB BLD-MCNC: 8.3 G/DL — LOW (ref 11.5–15.5)
HGB BLD-MCNC: 8.8 G/DL — LOW (ref 11.5–15.5)
MAGNESIUM SERPL-MCNC: 1.8 MG/DL — SIGNIFICANT CHANGE UP (ref 1.6–2.6)
MCHC RBC-ENTMCNC: 28 PG — SIGNIFICANT CHANGE UP (ref 27–34)
MCHC RBC-ENTMCNC: 28.2 PG — SIGNIFICANT CHANGE UP (ref 27–34)
MCHC RBC-ENTMCNC: 31.9 % — LOW (ref 32–36)
MCHC RBC-ENTMCNC: 32.2 % — SIGNIFICANT CHANGE UP (ref 32–36)
MCV RBC AUTO: 87.8 FL — SIGNIFICANT CHANGE UP (ref 80–100)
MCV RBC AUTO: 87.9 FL — SIGNIFICANT CHANGE UP (ref 80–100)
PHOSPHATE SERPL-MCNC: 4.9 MG/DL — HIGH (ref 2.5–4.5)
PLATELET # BLD AUTO: 262 K/UL — SIGNIFICANT CHANGE UP (ref 150–400)
PLATELET # BLD AUTO: 291 K/UL — SIGNIFICANT CHANGE UP (ref 150–400)
PMV BLD: 10 FL — SIGNIFICANT CHANGE UP (ref 7–13)
PMV BLD: 10.5 FL — SIGNIFICANT CHANGE UP (ref 7–13)
POTASSIUM SERPL-MCNC: 4.1 MMOL/L — SIGNIFICANT CHANGE UP (ref 3.5–5.3)
POTASSIUM SERPL-SCNC: 4.1 MMOL/L — SIGNIFICANT CHANGE UP (ref 3.5–5.3)
PROT SERPL-MCNC: 5.7 G/DL — LOW (ref 6–8.3)
RBC # BLD: 2.94 M/UL — LOW (ref 3.8–5.2)
RBC # BLD: 3.14 M/UL — LOW (ref 3.8–5.2)
RBC # FLD: 14.7 % — HIGH (ref 10.3–14.5)
RBC # FLD: 14.7 % — HIGH (ref 10.3–14.5)
SODIUM SERPL-SCNC: 137 MMOL/L — SIGNIFICANT CHANGE UP (ref 135–145)
WBC # BLD: 7.04 K/UL — SIGNIFICANT CHANGE UP (ref 3.8–10.5)
WBC # BLD: 9.86 K/UL — SIGNIFICANT CHANGE UP (ref 3.8–10.5)
WBC # FLD AUTO: 7.04 K/UL — SIGNIFICANT CHANGE UP (ref 3.8–10.5)
WBC # FLD AUTO: 9.86 K/UL — SIGNIFICANT CHANGE UP (ref 3.8–10.5)

## 2017-02-11 RX ADMIN — Medication 40 MILLIGRAM(S): at 22:35

## 2017-02-11 RX ADMIN — Medication 2: at 18:01

## 2017-02-11 RX ADMIN — HEPARIN SODIUM 5000 UNIT(S): 5000 INJECTION INTRAVENOUS; SUBCUTANEOUS at 22:35

## 2017-02-11 RX ADMIN — HEPARIN SODIUM 5000 UNIT(S): 5000 INJECTION INTRAVENOUS; SUBCUTANEOUS at 13:11

## 2017-02-11 RX ADMIN — Medication 40 MILLIGRAM(S): at 05:29

## 2017-02-11 RX ADMIN — Medication 40 MILLIGRAM(S): at 13:38

## 2017-02-11 RX ADMIN — Medication 3 MILLILITER(S): at 16:10

## 2017-02-11 RX ADMIN — HEPARIN SODIUM 5000 UNIT(S): 5000 INJECTION INTRAVENOUS; SUBCUTANEOUS at 05:28

## 2017-02-11 RX ADMIN — Medication 3: at 13:11

## 2017-02-11 RX ADMIN — Medication 3 MILLILITER(S): at 04:06

## 2017-02-11 RX ADMIN — Medication 3 MILLILITER(S): at 23:03

## 2017-02-11 RX ADMIN — Medication 3 MILLILITER(S): at 11:05

## 2017-02-11 RX ADMIN — Medication 1: at 08:40

## 2017-02-11 RX ADMIN — Medication 1: at 22:35

## 2017-02-12 LAB
HCT VFR BLD CALC: 26.4 % — LOW (ref 34.5–45)
HGB BLD-MCNC: 8.2 G/DL — LOW (ref 11.5–15.5)
MCHC RBC-ENTMCNC: 27.7 PG — SIGNIFICANT CHANGE UP (ref 27–34)
MCHC RBC-ENTMCNC: 31.1 % — LOW (ref 32–36)
MCV RBC AUTO: 89.2 FL — SIGNIFICANT CHANGE UP (ref 80–100)
PLATELET # BLD AUTO: 233 K/UL — SIGNIFICANT CHANGE UP (ref 150–400)
PMV BLD: 9.7 FL — SIGNIFICANT CHANGE UP (ref 7–13)
RBC # BLD: 2.96 M/UL — LOW (ref 3.8–5.2)
RBC # FLD: 14.9 % — HIGH (ref 10.3–14.5)
WBC # BLD: 8.13 K/UL — SIGNIFICANT CHANGE UP (ref 3.8–10.5)
WBC # FLD AUTO: 8.13 K/UL — SIGNIFICANT CHANGE UP (ref 3.8–10.5)

## 2017-02-12 RX ORDER — VERAPAMIL HCL 240 MG
40 CAPSULE, EXTENDED RELEASE PELLETS 24 HR ORAL EVERY 8 HOURS
Qty: 0 | Refills: 0 | Status: DISCONTINUED | OUTPATIENT
Start: 2017-02-12 | End: 2017-02-17

## 2017-02-12 RX ADMIN — Medication 2: at 12:54

## 2017-02-12 RX ADMIN — Medication 3 MILLILITER(S): at 22:16

## 2017-02-12 RX ADMIN — Medication 3 MILLILITER(S): at 16:01

## 2017-02-12 RX ADMIN — Medication 3 MILLILITER(S): at 03:16

## 2017-02-12 RX ADMIN — Medication 3 MILLILITER(S): at 10:35

## 2017-02-12 RX ADMIN — HEPARIN SODIUM 5000 UNIT(S): 5000 INJECTION INTRAVENOUS; SUBCUTANEOUS at 22:10

## 2017-02-12 RX ADMIN — HEPARIN SODIUM 5000 UNIT(S): 5000 INJECTION INTRAVENOUS; SUBCUTANEOUS at 13:04

## 2017-02-12 RX ADMIN — Medication 40 MILLIGRAM(S): at 05:23

## 2017-02-12 RX ADMIN — Medication 40 MILLIGRAM(S): at 13:04

## 2017-02-12 RX ADMIN — Medication 2: at 22:13

## 2017-02-12 RX ADMIN — Medication 1: at 09:09

## 2017-02-12 RX ADMIN — HEPARIN SODIUM 5000 UNIT(S): 5000 INJECTION INTRAVENOUS; SUBCUTANEOUS at 05:23

## 2017-02-12 RX ADMIN — Medication 1: at 17:20

## 2017-02-12 RX ADMIN — Medication 40 MILLIGRAM(S): at 22:10

## 2017-02-13 LAB
BUN SERPL-MCNC: 56 MG/DL — HIGH (ref 7–23)
CALCIUM SERPL-MCNC: 8.5 MG/DL — SIGNIFICANT CHANGE UP (ref 8.4–10.5)
CHLORIDE SERPL-SCNC: 101 MMOL/L — SIGNIFICANT CHANGE UP (ref 98–107)
CO2 SERPL-SCNC: 26 MMOL/L — SIGNIFICANT CHANGE UP (ref 22–31)
CREAT SERPL-MCNC: 2.52 MG/DL — HIGH (ref 0.5–1.3)
GLUCOSE SERPL-MCNC: 209 MG/DL — HIGH (ref 70–99)
HCT VFR BLD CALC: 28.7 % — LOW (ref 34.5–45)
HGB BLD-MCNC: 8.7 G/DL — LOW (ref 11.5–15.5)
MCHC RBC-ENTMCNC: 27.4 PG — SIGNIFICANT CHANGE UP (ref 27–34)
MCHC RBC-ENTMCNC: 30.3 % — LOW (ref 32–36)
MCV RBC AUTO: 90.5 FL — SIGNIFICANT CHANGE UP (ref 80–100)
PLATELET # BLD AUTO: 219 K/UL — SIGNIFICANT CHANGE UP (ref 150–400)
PMV BLD: 10 FL — SIGNIFICANT CHANGE UP (ref 7–13)
POTASSIUM SERPL-MCNC: 4.3 MMOL/L — SIGNIFICANT CHANGE UP (ref 3.5–5.3)
POTASSIUM SERPL-SCNC: 4.3 MMOL/L — SIGNIFICANT CHANGE UP (ref 3.5–5.3)
RBC # BLD: 3.17 M/UL — LOW (ref 3.8–5.2)
RBC # FLD: 14.7 % — HIGH (ref 10.3–14.5)
SODIUM SERPL-SCNC: 144 MMOL/L — SIGNIFICANT CHANGE UP (ref 135–145)
WBC # BLD: 3.9 K/UL — SIGNIFICANT CHANGE UP (ref 3.8–10.5)
WBC # FLD AUTO: 3.9 K/UL — SIGNIFICANT CHANGE UP (ref 3.8–10.5)

## 2017-02-13 PROCEDURE — 74230 X-RAY XM SWLNG FUNCJ C+: CPT | Mod: 26

## 2017-02-13 PROCEDURE — 99232 SBSQ HOSP IP/OBS MODERATE 35: CPT | Mod: GC

## 2017-02-13 RX ORDER — METOPROLOL TARTRATE 50 MG
5 TABLET ORAL ONCE
Qty: 0 | Refills: 0 | Status: COMPLETED | OUTPATIENT
Start: 2017-02-13 | End: 2017-02-13

## 2017-02-13 RX ORDER — AMLODIPINE BESYLATE 2.5 MG/1
5 TABLET ORAL DAILY
Qty: 0 | Refills: 0 | Status: DISCONTINUED | OUTPATIENT
Start: 2017-02-13 | End: 2017-02-15

## 2017-02-13 RX ORDER — METOPROLOL TARTRATE 50 MG
25 TABLET ORAL
Qty: 0 | Refills: 0 | Status: DISCONTINUED | OUTPATIENT
Start: 2017-02-13 | End: 2017-02-17

## 2017-02-13 RX ADMIN — HEPARIN SODIUM 5000 UNIT(S): 5000 INJECTION INTRAVENOUS; SUBCUTANEOUS at 13:09

## 2017-02-13 RX ADMIN — Medication 2: at 17:43

## 2017-02-13 RX ADMIN — Medication 5 MILLIGRAM(S): at 10:55

## 2017-02-13 RX ADMIN — Medication 25 MILLIGRAM(S): at 17:44

## 2017-02-13 RX ADMIN — Medication 3 MILLILITER(S): at 11:04

## 2017-02-13 RX ADMIN — Medication 1: at 23:06

## 2017-02-13 RX ADMIN — Medication 40 MILLIGRAM(S): at 13:09

## 2017-02-13 RX ADMIN — Medication 3 MILLILITER(S): at 03:48

## 2017-02-13 RX ADMIN — HEPARIN SODIUM 5000 UNIT(S): 5000 INJECTION INTRAVENOUS; SUBCUTANEOUS at 23:06

## 2017-02-13 RX ADMIN — Medication 3 MILLILITER(S): at 22:21

## 2017-02-13 RX ADMIN — Medication 2: at 09:02

## 2017-02-13 RX ADMIN — Medication 3 MILLILITER(S): at 17:07

## 2017-02-13 RX ADMIN — Medication 40 MILLIGRAM(S): at 05:28

## 2017-02-13 RX ADMIN — AMLODIPINE BESYLATE 5 MILLIGRAM(S): 2.5 TABLET ORAL at 08:50

## 2017-02-13 RX ADMIN — HEPARIN SODIUM 5000 UNIT(S): 5000 INJECTION INTRAVENOUS; SUBCUTANEOUS at 05:27

## 2017-02-13 RX ADMIN — Medication 40 MILLIGRAM(S): at 23:06

## 2017-02-13 RX ADMIN — Medication 3: at 12:30

## 2017-02-13 NOTE — SWALLOW VFSS/MBS ASSESSMENT ADULT - UNSUCCESSFUL STRATEGIES TRIALED DURING PROCEDURE
Compensatory strategies were not trialed during this study as the patient presents with reduced cognitive capacity to follow through with low level directives.

## 2017-02-13 NOTE — SWALLOW VFSS/MBS ASSESSMENT ADULT - DIAGNOSTIC IMPRESSIONS
1) Mild to moderate oral dysphagia for puree, honey-thick, nectar-thick and thin liquids marked by adequate oral containment, increased oral transit time and decreased AP transport; uncontrolled bolus spillover to the hypopharynx noted with nectar-thick and thin liquids.  2) Mild pharyngeal dysphagia for puree and honey-thick liquids via controlled teaspoon presentations marked by timely initiation of the pharyngeal swallow, reduced base of tongue retraction with mild stasis remaining along the base of tongue and in the valleculae, reduced pharyngeal constriction with mild stasis remaining along the posterior pharyngeal wall, decreased hyolaryngeal elevation/excursion, and no evidence of laryngeal penetration/aspiration.  3) Moderate pharyngeal dysphagia for honey-thick liquids via cup sip presentations marked by timely initiation of the pharyngeal swallow, reduced base of tongue retraction with mild stasis remaining along the base of tongue and in the valleculae, reduced pharyngeal constriction with mild stasis remaining along the posterior pharyngeal wall, and decreased hyolaryngeal elevation/excursion with incomplete closure of the laryngeal vestibule resulting in laryngeal penetration (silent) without retrieval; Pt without cough response indicating reduced laryngo-pharyngeal sensation.  4) Moderate pharyngeal dysphagia for nectar-thick and thin liquids via teaspoon presentation marked by delayed swallow with the bolus head at the level of the valleculae, reduced base of tongue retraction with trace stasis remaining along the base of tongue and in the valleculae, reduced pharyngeal constriction with trace stasis remaining along the posterior pharyngeal wall, and decreased hyolaryngeal elevation/excursion with incomplete closure of the laryngeal vestibule resulting in laryngeal penetration without retrieval. Pt with inconsistent cough response indicating reduced laryngo-pharyngeal sensation. 1) Mild to moderate oral dysphagia for puree, honey, nectar and thin liquids marked by adequate oral containment, increased oral transit time and decreased AP transport; uncontrolled bolus spillover to the hypopharynx noted with nectar-thick and thin liquids.  2) Mild pharyngeal dysphagia for puree and honey liquids via controlled teaspoon presentations marked by timely initiation of the pharyngeal swallow, reduced base of tongue retraction with mild stasis remaining along the base of tongue and in the valleculae, reduced pharyngeal constriction with mild stasis remaining along the posterior pharyngeal wall, decreased hyolaryngeal elevation/excursion, and no evidence of laryngeal penetration/aspiration.  3) Moderate pharyngeal dysphagia for honey via cup sip presentations marked by timely initiation of the pharyngeal swallow, reduced base of tongue retraction with mild stasis remaining along the base of tongue and in the valleculae, reduced pharyngeal constriction with trace stasis remaining along the posterior pharyngeal wall, and decreased hyolaryngeal elevation/excursion with incomplete closure of the laryngeal vestibule resulting in laryngeal penetration (silent) without retrieval; Pt without cough response indicating reduced laryngo-pharyngeal sensation.  4) Moderate pharyngeal dysphagia for nectar and thins via teaspoon and cup sip presentations marked by delayed swallow with the bolus head at the level of the valleculae, reduced base of tongue retraction with trace stasis remaining along the base of tongue and in the valleculae, reduced pharyngeal constriction with trace stasis remaining along the posterior pharyngeal wall, and decreased hyolaryngeal elevation/excursion with incomplete closure of the laryngeal vestibule resulting in laryngeal penetration without retrieval. Pt with inconsistent cough response indicating reduced laryngo-pharyngeal sensation. It should be noted that pt demonstrated delayed coughing after the study suggesting possible aspiration. 1) Mild-Moderate oral dysphagia for puree, honey, nectar and thins marked by adequate oral containment, increased oral transit time and decreased AP transport; uncontrolled bolus spillover to the hypopharynx noted with nectar and thins.  2) Mild pharyngeal dysphagia for puree and honey via controlled teaspoon presentations marked by timely initiation of the pharyngeal swallow, reduced base of tongue retraction w/ mild stasis remaining along the base of tongue and in the valleculae, reduced pharyngeal constriction w/ mild stasis remaining along the posterior pharyngeal wall, and decreased laryngeal elevation w/ incomplete closure of the laryngeal vestibule resulting in trace penetration with complete retrieval with honey.  3) Moderate pharyngeal dysphagia for honey via cup sip presentations marked by timely initiation of the pharyngeal swallow, reduced base of tongue retraction with mild stasis remaining along the base of tongue and in the valleculae, reduced pharyngeal constriction with trace stasis remaining along the posterior pharyngeal wall, and decreased hyolaryngeal elevation with incomplete closure of the laryngeal vestibule resulting in laryngeal penetration (silent) without retrieval; Pt without cough response indicating reduced laryngo-pharyngeal sensation.  4) Moderate pharyngeal dysphagia for nectar and thins via teaspoon and cup presentations marked by delayed swallow with the bolus head at the level of the valleculae, reduced base of tongue retraction with trace stasis remaining along the base of tongue and in the valleculae, reduced pharyngeal constriction with trace stasis remaining along the posterior pharyngeal wall, and decreased laryngeal elevation with incomplete closure of the laryngeal vestibule resulting in laryngeal penetration without retrieval. Pt with inconsistent cough response indicating reduced laryngo-pharyngeal sensation. It should be noted that pt demonstrated delayed coughing after the study suggesting possible aspiration. 1) Mild-Moderate oral dysphagia for puree, honey, nectar and thins marked by adequate oral containment, increased oral transit time and decreased AP transport; uncontrolled bolus spillover to the hypopharynx noted with nectar and thins.  2) Mild pharyngeal dysphagia for puree and honey via controlled teaspoon presentations marked by timely initiation of the pharyngeal swallow, reduced base of tongue retraction w/ mild stasis remaining along the base of tongue and in the valleculae, reduced pharyngeal constriction w/ mild stasis remaining along the posterior pharyngeal wall, and decreased laryngeal elevation w/ incomplete closure of the laryngeal vestibule resulting in trace penetration w/ complete retrieval with honey. No evidence of penetration/aspiration with puree.  3) Moderate pharyngeal dysphagia for honey via cup presentations marked by timely initiation of the pharyngeal swallow, reduced base of tongue retraction w/ mild stasis remaining along the base of tongue and in the valleculae, reduced pharyngeal constriction w/ trace stasis along the posterior pharyngeal wall, and decreased hyolaryngeal elevation w/ incomplete closure of the laryngeal vestibule resulting in laryngeal penetration (silent) without retrieval; Pt without cough response indicating reduced laryngo-pharyngeal sensation.  4) Moderate pharyngeal dysphagia for nectar and thins via teaspoon and cup presentations marked by delayed swallow w/ the bolus head at the level of the valleculae, reduced base of tongue retraction w/ trace stasis remaining along the base of tongue and in the valleculae, reduced pharyngeal constriction w/ trace stasis remaining along the posterior pharyngeal wall, and decreased laryngeal elevation w/ incomplete closure of the laryngeal vestibule resulting in penetration without retrieval. Pt w/ inconsistent cough response indicating reduced laryngo-pharyngeal sensation. It should be noted that pt demonstrated delayed coughing after the study suggesting eventual aspiration.

## 2017-02-13 NOTE — SWALLOW VFSS/MBS ASSESSMENT ADULT - PHARYNGEAL PHASE COMMENTS
Timely initiation of the pharyngeal swallow, reduced base of tongue retraction, reduced pharyngeal constriction, and decreased hyolaryngeal elevation/excursion. Timely initiation of the pharyngeal swallow, reduced base of tongue retraction, reduced pharyngeal constriction, and decreased hyolaryngeal elevation/excursion

## 2017-02-13 NOTE — SWALLOW VFSS/MBS ASSESSMENT ADULT - ADDITIONAL RECOMMENDATIONS
The patient will be followed for trial swallow therapy during this admission. If able to participate, patient would benefit from outpatient/rehab services following discharge. Information regarding outpatient services at [facility] can be reached at [phone]

## 2017-02-13 NOTE — SWALLOW VFSS/MBS ASSESSMENT ADULT - RECOMMENDED CONSISTENCY
1) Puree with Honey-Thick Liquids via TEASPOON ONLY  2) Full feeding assistance  3) Safe Feeding/Swallowing Guidelines: Upright position during/after PO intake for 30 min, Small bites/sips, Allow time between swallows, Crush medication when feasible, No straws. 1) Puree with Honey-Thick Liquids via TEASPOON ONLY  2) Full feeding assistance  3) Safe Feeding/Swallowing Guidelines: Upright position during/after PO intake for 30 min, Small bites/sips, Allow time between swallows, Crush medication when feasible, No straws / No cup sips.

## 2017-02-13 NOTE — SWALLOW VFSS/MBS ASSESSMENT ADULT - NS SWALLOW VFSS REC ASPIR MON
throat clearing/upper respiratory infection/cough/gurgly voice/pneumonia/fever/oral hygiene/position upright (90Y)/change of breathing pattern

## 2017-02-13 NOTE — SWALLOW VFSS/MBS ASSESSMENT ADULT - ORAL PHASE
Delayed oral transit time/Reduced anterior - posterior transport Uncontrolled bolus / spillover in hypopharynx

## 2017-02-13 NOTE — SWALLOW BEDSIDE ASSESSMENT ADULT - COMMENTS
Patient was received in the radiology suite this AM at which time patient was awake and cooperative. Supplemental O2 via nasal canula in place. Patient appeared with slightly increased work of breathing on exertion. Patient was non-verbal throughout this study. Findings and recommendations were discussed with the medical team via spectra #[id number].

## 2017-02-13 NOTE — SWALLOW VFSS/MBS ASSESSMENT ADULT - COMMENTS
Patient was received in the radiology suite this AM at which time patient was awake and cooperative. Supplemental O2 via nasal canula in place. Patient appeared with slightly increased work of breathing on exertion. Patient was non-verbal throughout this study. Findings and recommendations were discussed with the medical team via spectra #[id number]. Patient was received in the radiology suite this AM at which time patient was lethargic yet awake and cooperative. Supplemental O2 via nasal canula in place. Patient appeared with slightly increased work of breathing on exertion. Patient was non-verbal throughout this study. Findings and recommendations were discussed with the medical team via spectra #[id number].

## 2017-02-14 LAB
BASOPHILS # BLD AUTO: 0.01 K/UL — SIGNIFICANT CHANGE UP (ref 0–0.2)
BASOPHILS NFR BLD AUTO: 0.2 % — SIGNIFICANT CHANGE UP (ref 0–2)
BUN SERPL-MCNC: 55 MG/DL — HIGH (ref 7–23)
CALCIUM SERPL-MCNC: 8.6 MG/DL — SIGNIFICANT CHANGE UP (ref 8.4–10.5)
CHLORIDE SERPL-SCNC: 105 MMOL/L — SIGNIFICANT CHANGE UP (ref 98–107)
CO2 SERPL-SCNC: 28 MMOL/L — SIGNIFICANT CHANGE UP (ref 22–31)
CREAT SERPL-MCNC: 2.29 MG/DL — HIGH (ref 0.5–1.3)
EOSINOPHIL # BLD AUTO: 0 K/UL — SIGNIFICANT CHANGE UP (ref 0–0.5)
EOSINOPHIL NFR BLD AUTO: 0 % — SIGNIFICANT CHANGE UP (ref 0–6)
GLUCOSE SERPL-MCNC: 217 MG/DL — HIGH (ref 70–99)
HCT VFR BLD CALC: 26.7 % — LOW (ref 34.5–45)
HGB BLD-MCNC: 8.3 G/DL — LOW (ref 11.5–15.5)
IMM GRANULOCYTES NFR BLD AUTO: 0.6 % — SIGNIFICANT CHANGE UP (ref 0–1.5)
LYMPHOCYTES # BLD AUTO: 0.74 K/UL — LOW (ref 1–3.3)
LYMPHOCYTES # BLD AUTO: 15.7 % — SIGNIFICANT CHANGE UP (ref 13–44)
MCHC RBC-ENTMCNC: 28.1 PG — SIGNIFICANT CHANGE UP (ref 27–34)
MCHC RBC-ENTMCNC: 31.1 % — LOW (ref 32–36)
MCV RBC AUTO: 90.5 FL — SIGNIFICANT CHANGE UP (ref 80–100)
MONOCYTES # BLD AUTO: 0.5 K/UL — SIGNIFICANT CHANGE UP (ref 0–0.9)
MONOCYTES NFR BLD AUTO: 10.6 % — SIGNIFICANT CHANGE UP (ref 2–14)
NEUTROPHILS # BLD AUTO: 3.44 K/UL — SIGNIFICANT CHANGE UP (ref 1.8–7.4)
NEUTROPHILS NFR BLD AUTO: 72.9 % — SIGNIFICANT CHANGE UP (ref 43–77)
PLATELET # BLD AUTO: 194 K/UL — SIGNIFICANT CHANGE UP (ref 150–400)
PMV BLD: 9.7 FL — SIGNIFICANT CHANGE UP (ref 7–13)
POTASSIUM SERPL-MCNC: 4.2 MMOL/L — SIGNIFICANT CHANGE UP (ref 3.5–5.3)
POTASSIUM SERPL-SCNC: 4.2 MMOL/L — SIGNIFICANT CHANGE UP (ref 3.5–5.3)
RBC # BLD: 2.95 M/UL — LOW (ref 3.8–5.2)
RBC # FLD: 14.5 % — SIGNIFICANT CHANGE UP (ref 10.3–14.5)
SODIUM SERPL-SCNC: 145 MMOL/L — SIGNIFICANT CHANGE UP (ref 135–145)
WBC # BLD: 4.72 K/UL — SIGNIFICANT CHANGE UP (ref 3.8–10.5)
WBC # FLD AUTO: 4.72 K/UL — SIGNIFICANT CHANGE UP (ref 3.8–10.5)

## 2017-02-14 PROCEDURE — 99232 SBSQ HOSP IP/OBS MODERATE 35: CPT | Mod: GC

## 2017-02-14 RX ORDER — INSULIN LISPRO 100/ML
VIAL (ML) SUBCUTANEOUS
Qty: 0 | Refills: 0 | Status: DISCONTINUED | OUTPATIENT
Start: 2017-02-14 | End: 2017-02-17

## 2017-02-14 RX ORDER — INSULIN LISPRO 100/ML
VIAL (ML) SUBCUTANEOUS AT BEDTIME
Qty: 0 | Refills: 0 | Status: DISCONTINUED | OUTPATIENT
Start: 2017-02-14 | End: 2017-02-17

## 2017-02-14 RX ADMIN — Medication 25 MILLIGRAM(S): at 05:45

## 2017-02-14 RX ADMIN — Medication 40 MILLIGRAM(S): at 05:46

## 2017-02-14 RX ADMIN — HEPARIN SODIUM 5000 UNIT(S): 5000 INJECTION INTRAVENOUS; SUBCUTANEOUS at 05:46

## 2017-02-14 RX ADMIN — Medication 3 MILLILITER(S): at 21:53

## 2017-02-14 RX ADMIN — Medication 3 MILLILITER(S): at 17:08

## 2017-02-14 RX ADMIN — Medication 3 MILLILITER(S): at 12:05

## 2017-02-14 RX ADMIN — Medication 25 MILLIGRAM(S): at 17:58

## 2017-02-14 RX ADMIN — Medication 2: at 09:30

## 2017-02-14 RX ADMIN — AMLODIPINE BESYLATE 5 MILLIGRAM(S): 2.5 TABLET ORAL at 05:45

## 2017-02-14 RX ADMIN — Medication 2: at 22:27

## 2017-02-14 RX ADMIN — Medication 3 MILLILITER(S): at 03:29

## 2017-02-14 RX ADMIN — HEPARIN SODIUM 5000 UNIT(S): 5000 INJECTION INTRAVENOUS; SUBCUTANEOUS at 13:14

## 2017-02-14 RX ADMIN — Medication 4: at 12:40

## 2017-02-14 RX ADMIN — Medication 40 MILLIGRAM(S): at 13:14

## 2017-02-14 RX ADMIN — Medication 6: at 17:25

## 2017-02-14 RX ADMIN — HEPARIN SODIUM 5000 UNIT(S): 5000 INJECTION INTRAVENOUS; SUBCUTANEOUS at 21:18

## 2017-02-14 RX ADMIN — Medication 40 MILLIGRAM(S): at 21:18

## 2017-02-14 NOTE — PROVIDER CONTACT NOTE (OTHER) - DATE AND TIME:
31-Jan-2017 11:00
07-Feb-2017 11:09
07-Feb-2017 13:31
07-Feb-2017 18:02
08-Feb-2017 05:00
08-Feb-2017 15:15
13-Feb-2017 07:30
13-Feb-2017 17:20
14-Feb-2017 17:55
14-Feb-2017 23:00
15-Feb-2017 21:15
28-Jan-2017 06:58
28-Jan-2017 17:29

## 2017-02-14 NOTE — PROVIDER CONTACT NOTE (OTHER) - NAME OF MD/NP/PA/DO NOTIFIED:
ADS- Jasmin NP
Donna BALTAZAR
Dorys BUCKLEY
Dr. Nova [id number]
Kylee GREEN, ADS
Kylee GREEN, NP
Kylee GREEN, NP
NP - Rosa Maria Prieto
NP Au
NP Paty Moya
NP Paty Moya
Jaspal FARMER MD
GIOVANNY Cole

## 2017-02-14 NOTE — PROVIDER CONTACT NOTE (OTHER) - REASON
Abnormal V/S
Abnormal v/s
BP elevated
High BP
Pt had 50cc urine output this am
Pt has critical value
Vanco random level 25.9
elevated BP
elevated BP
morning labs
pt bp elevated
pt tachycardic
Pt has respiratory rate of 28

## 2017-02-14 NOTE — PROVIDER CONTACT NOTE (OTHER) - SITUATION
Sepsis on ARF,vanco dosing per level
Informed NP that patient's v/s were, 156/61,,Resp 28, SPO2 99% bipap and temp 97.7
Informed PA that patient's HR was 121 and RR 30, all other v/s stable
Patient has high BP of 170/87.
Pt bp is 190/76 HR 91
Pt h/h is 6.7/22.3
Pt noted with 50cc urine output this am
Unable to obtain labs, pt was a hard stick. Stuck pt 5 times.
pt 
pt continues with elevated 's
pt continues with elevated BP throughout the day, was slowly trending down earlier but now elevated again as noted in flowsheet
pt with elevated SBP in 180's
Pt has respiratory rate of 28

## 2017-02-14 NOTE — PROVIDER CONTACT NOTE (OTHER) - ASSESSMENT
Pt lethargic; all other VSS.
No acute distress noted.
Notification popped up on pt's chart. I didn't receive any phone calls from the Lab yet.
Patient awake and alert. No signs of distress. Vital signs stable.
Patient is alert and able to make needs known
Patient remains alert and denies pain
Pt has only one arm, very thin and superficial veins
asymptomatic
asymptomatic
pt appears asymptomatic, VS as noted in flowsheet
pt appears stable
pt asymptomatic, BP has been elevated since overnight

## 2017-02-14 NOTE — PROVIDER CONTACT NOTE (OTHER) - ACTION/TREATMENT ORDERED:
NP Payton Cole notified; MICU consult ordered; continue to monitor.
no order for vancomycin now
Administer 2200 40mg dose of Verapamil as ordered. Will continue to monitor.
Advised unable to obtain labs.
NP Au notified, will adjust medication regimen. unable to restart ACE-I
NP aware, pulm team (NP, fellow, +attending) updated on pt status throughout shift. as per team, cont with current management so as not to drop BP rapidly. will cont to monitor. night RN notified.
NP notified, will give due metoprolol and continue to monitor. no new orders.
Notified NP, advise to monitor.
Notified NP, advise will follow up.
Notified NP, advise will order one time BP medication. Pt pending dialysis today.
PA notified and aware
Will continue to monitor
Will continue to monitor.

## 2017-02-14 NOTE — PROVIDER CONTACT NOTE (OTHER) - RECOMMENDATIONS
Continue to monitor
Awaiting xray for confirmation of NGT placement to administer medication
Continue to monitor.
NP notified.
NP to assess
frequent rounding done to ensure pt's safety
give IV anti-hypertensives, adjust regimen
increase BP medication regimen
increase dosage of antihypertensives

## 2017-02-14 NOTE — PROVIDER CONTACT NOTE (OTHER) - BACKGROUND
Pt admitted for sepsis
Admitted for sepsis
Patient admitted for sepsis and pneumonia.
Patient is admitted for sepsis/pna
Patient is admitted for sepsis/pna
Pt admittted with goff cath form 7-11;30 had 150cc urine output
admitted for sepsis
admitted for sepsis
pt admitted for resp failure
pt admitted with PNA/ Sepsis/ BENJI
pt admitted with sepsis/PNA/ BENJI
pt admitted with sepsis/pna/ arsen.  BP has been running high, meds adjusted yesterday, generally trending down

## 2017-02-15 LAB
BUN SERPL-MCNC: 60 MG/DL — HIGH (ref 7–23)
CALCIUM SERPL-MCNC: 8.5 MG/DL — SIGNIFICANT CHANGE UP (ref 8.4–10.5)
CHLORIDE SERPL-SCNC: 109 MMOL/L — HIGH (ref 98–107)
CO2 SERPL-SCNC: 27 MMOL/L — SIGNIFICANT CHANGE UP (ref 22–31)
CREAT SERPL-MCNC: 2.33 MG/DL — HIGH (ref 0.5–1.3)
GLUCOSE SERPL-MCNC: 296 MG/DL — HIGH (ref 70–99)
HCT VFR BLD CALC: 28 % — LOW (ref 34.5–45)
HGB BLD-MCNC: 8.3 G/DL — LOW (ref 11.5–15.5)
MCHC RBC-ENTMCNC: 26.9 PG — LOW (ref 27–34)
MCHC RBC-ENTMCNC: 29.6 % — LOW (ref 32–36)
MCV RBC AUTO: 90.6 FL — SIGNIFICANT CHANGE UP (ref 80–100)
PLATELET # BLD AUTO: 161 K/UL — SIGNIFICANT CHANGE UP (ref 150–400)
PMV BLD: 9.7 FL — SIGNIFICANT CHANGE UP (ref 7–13)
POTASSIUM SERPL-MCNC: 4 MMOL/L — SIGNIFICANT CHANGE UP (ref 3.5–5.3)
POTASSIUM SERPL-SCNC: 4 MMOL/L — SIGNIFICANT CHANGE UP (ref 3.5–5.3)
RBC # BLD: 3.09 M/UL — LOW (ref 3.8–5.2)
RBC # FLD: 14.6 % — HIGH (ref 10.3–14.5)
SODIUM SERPL-SCNC: 151 MMOL/L — HIGH (ref 135–145)
WBC # BLD: 4.77 K/UL — SIGNIFICANT CHANGE UP (ref 3.8–10.5)
WBC # FLD AUTO: 4.77 K/UL — SIGNIFICANT CHANGE UP (ref 3.8–10.5)

## 2017-02-15 PROCEDURE — 99232 SBSQ HOSP IP/OBS MODERATE 35: CPT | Mod: GC

## 2017-02-15 RX ORDER — METOPROLOL TARTRATE 50 MG
1 TABLET ORAL
Qty: 0 | Refills: 0 | COMMUNITY
Start: 2017-02-15

## 2017-02-15 RX ORDER — AMLODIPINE BESYLATE 2.5 MG/1
10 TABLET ORAL DAILY
Qty: 0 | Refills: 0 | Status: DISCONTINUED | OUTPATIENT
Start: 2017-02-15 | End: 2017-02-17

## 2017-02-15 RX ORDER — INSULIN LISPRO 100/ML
0 VIAL (ML) SUBCUTANEOUS
Qty: 0 | Refills: 0 | COMMUNITY
Start: 2017-02-15

## 2017-02-15 RX ORDER — AMLODIPINE BESYLATE 2.5 MG/1
5 TABLET ORAL ONCE
Qty: 0 | Refills: 0 | Status: COMPLETED | OUTPATIENT
Start: 2017-02-15 | End: 2017-02-15

## 2017-02-15 RX ORDER — SODIUM CHLORIDE 9 MG/ML
1000 INJECTION, SOLUTION INTRAVENOUS
Qty: 0 | Refills: 0 | Status: DISCONTINUED | OUTPATIENT
Start: 2017-02-15 | End: 2017-02-15

## 2017-02-15 RX ORDER — IPRATROPIUM/ALBUTEROL SULFATE 18-103MCG
3 AEROSOL WITH ADAPTER (GRAM) INHALATION
Qty: 0 | Refills: 0 | COMMUNITY
Start: 2017-02-15

## 2017-02-15 RX ORDER — INSULIN LISPRO 100/ML
3 VIAL (ML) SUBCUTANEOUS
Qty: 0 | Refills: 0 | Status: DISCONTINUED | OUTPATIENT
Start: 2017-02-15 | End: 2017-02-17

## 2017-02-15 RX ORDER — AMLODIPINE BESYLATE 2.5 MG/1
1 TABLET ORAL
Qty: 0 | Refills: 0 | COMMUNITY
Start: 2017-02-15

## 2017-02-15 RX ORDER — SODIUM CHLORIDE 9 MG/ML
1000 INJECTION, SOLUTION INTRAVENOUS
Qty: 0 | Refills: 0 | Status: DISCONTINUED | OUTPATIENT
Start: 2017-02-15 | End: 2017-02-16

## 2017-02-15 RX ORDER — INSULIN GLARGINE 100 [IU]/ML
10 INJECTION, SOLUTION SUBCUTANEOUS AT BEDTIME
Qty: 0 | Refills: 0 | Status: DISCONTINUED | OUTPATIENT
Start: 2017-02-15 | End: 2017-02-17

## 2017-02-15 RX ORDER — LISINOPRIL 2.5 MG/1
1 TABLET ORAL
Qty: 0 | Refills: 0 | COMMUNITY

## 2017-02-15 RX ORDER — VERAPAMIL HCL 240 MG
1 CAPSULE, EXTENDED RELEASE PELLETS 24 HR ORAL
Qty: 0 | Refills: 0 | COMMUNITY
Start: 2017-02-15

## 2017-02-15 RX ORDER — VERAPAMIL HCL 240 MG
1 CAPSULE, EXTENDED RELEASE PELLETS 24 HR ORAL
Qty: 0 | Refills: 0 | COMMUNITY

## 2017-02-15 RX ADMIN — HEPARIN SODIUM 5000 UNIT(S): 5000 INJECTION INTRAVENOUS; SUBCUTANEOUS at 05:42

## 2017-02-15 RX ADMIN — INSULIN GLARGINE 10 UNIT(S): 100 INJECTION, SOLUTION SUBCUTANEOUS at 21:13

## 2017-02-15 RX ADMIN — Medication: at 12:06

## 2017-02-15 RX ADMIN — Medication 6: at 09:05

## 2017-02-15 RX ADMIN — SODIUM CHLORIDE 50 MILLILITER(S): 9 INJECTION, SOLUTION INTRAVENOUS at 16:54

## 2017-02-15 RX ADMIN — Medication 40 MILLIGRAM(S): at 21:06

## 2017-02-15 RX ADMIN — Medication 3 MILLILITER(S): at 03:16

## 2017-02-15 RX ADMIN — Medication 3 MILLILITER(S): at 10:50

## 2017-02-15 RX ADMIN — AMLODIPINE BESYLATE 5 MILLIGRAM(S): 2.5 TABLET ORAL at 05:43

## 2017-02-15 RX ADMIN — Medication 2: at 23:14

## 2017-02-15 RX ADMIN — Medication 40 MILLIGRAM(S): at 05:42

## 2017-02-15 RX ADMIN — Medication 25 MILLIGRAM(S): at 05:42

## 2017-02-15 RX ADMIN — Medication 3 UNIT(S): at 17:37

## 2017-02-15 RX ADMIN — AMLODIPINE BESYLATE 5 MILLIGRAM(S): 2.5 TABLET ORAL at 12:06

## 2017-02-15 RX ADMIN — Medication 3 MILLILITER(S): at 21:16

## 2017-02-15 RX ADMIN — Medication 3 MILLILITER(S): at 16:09

## 2017-02-15 RX ADMIN — HEPARIN SODIUM 5000 UNIT(S): 5000 INJECTION INTRAVENOUS; SUBCUTANEOUS at 13:18

## 2017-02-15 RX ADMIN — HEPARIN SODIUM 5000 UNIT(S): 5000 INJECTION INTRAVENOUS; SUBCUTANEOUS at 21:06

## 2017-02-15 RX ADMIN — Medication 25 MILLIGRAM(S): at 17:38

## 2017-02-15 RX ADMIN — Medication 8: at 17:37

## 2017-02-15 RX ADMIN — Medication 40 MILLIGRAM(S): at 13:18

## 2017-02-16 LAB
BUN SERPL-MCNC: 62 MG/DL — HIGH (ref 7–23)
BUN SERPL-MCNC: 63 MG/DL — HIGH (ref 7–23)
CALCIUM SERPL-MCNC: 8.1 MG/DL — LOW (ref 8.4–10.5)
CALCIUM SERPL-MCNC: 8.4 MG/DL — SIGNIFICANT CHANGE UP (ref 8.4–10.5)
CHLORIDE SERPL-SCNC: 109 MMOL/L — HIGH (ref 98–107)
CHLORIDE SERPL-SCNC: 109 MMOL/L — HIGH (ref 98–107)
CO2 SERPL-SCNC: 25 MMOL/L — SIGNIFICANT CHANGE UP (ref 22–31)
CO2 SERPL-SCNC: 30 MMOL/L — SIGNIFICANT CHANGE UP (ref 22–31)
CREAT SERPL-MCNC: 2.1 MG/DL — HIGH (ref 0.5–1.3)
CREAT SERPL-MCNC: 2.1 MG/DL — HIGH (ref 0.5–1.3)
GLUCOSE SERPL-MCNC: 151 MG/DL — HIGH (ref 70–99)
GLUCOSE SERPL-MCNC: 168 MG/DL — HIGH (ref 70–99)
HCT VFR BLD CALC: 29 % — LOW (ref 34.5–45)
HGB BLD-MCNC: 8.5 G/DL — LOW (ref 11.5–15.5)
MCHC RBC-ENTMCNC: 27 PG — SIGNIFICANT CHANGE UP (ref 27–34)
MCHC RBC-ENTMCNC: 29.3 % — LOW (ref 32–36)
MCV RBC AUTO: 92.1 FL — SIGNIFICANT CHANGE UP (ref 80–100)
PLATELET # BLD AUTO: 171 K/UL — SIGNIFICANT CHANGE UP (ref 150–400)
PMV BLD: 10.2 FL — SIGNIFICANT CHANGE UP (ref 7–13)
POTASSIUM SERPL-MCNC: 3.6 MMOL/L — SIGNIFICANT CHANGE UP (ref 3.5–5.3)
POTASSIUM SERPL-MCNC: 3.7 MMOL/L — SIGNIFICANT CHANGE UP (ref 3.5–5.3)
POTASSIUM SERPL-SCNC: 3.6 MMOL/L — SIGNIFICANT CHANGE UP (ref 3.5–5.3)
POTASSIUM SERPL-SCNC: 3.7 MMOL/L — SIGNIFICANT CHANGE UP (ref 3.5–5.3)
RBC # BLD: 3.15 M/UL — LOW (ref 3.8–5.2)
RBC # FLD: 14.6 % — HIGH (ref 10.3–14.5)
SODIUM SERPL-SCNC: 150 MMOL/L — HIGH (ref 135–145)
SODIUM SERPL-SCNC: 153 MMOL/L — HIGH (ref 135–145)
WBC # BLD: 5.08 K/UL — SIGNIFICANT CHANGE UP (ref 3.8–10.5)
WBC # FLD AUTO: 5.08 K/UL — SIGNIFICANT CHANGE UP (ref 3.8–10.5)

## 2017-02-16 PROCEDURE — 99232 SBSQ HOSP IP/OBS MODERATE 35: CPT | Mod: GC

## 2017-02-16 RX ORDER — SODIUM CHLORIDE 9 MG/ML
1000 INJECTION, SOLUTION INTRAVENOUS
Qty: 0 | Refills: 0 | Status: COMPLETED | OUTPATIENT
Start: 2017-02-16 | End: 2017-02-17

## 2017-02-16 RX ORDER — SODIUM CHLORIDE 9 MG/ML
1000 INJECTION, SOLUTION INTRAVENOUS
Qty: 0 | Refills: 0 | Status: DISCONTINUED | OUTPATIENT
Start: 2017-02-16 | End: 2017-02-16

## 2017-02-16 RX ADMIN — HEPARIN SODIUM 5000 UNIT(S): 5000 INJECTION INTRAVENOUS; SUBCUTANEOUS at 13:48

## 2017-02-16 RX ADMIN — Medication 3 MILLILITER(S): at 16:11

## 2017-02-16 RX ADMIN — AMLODIPINE BESYLATE 10 MILLIGRAM(S): 2.5 TABLET ORAL at 05:28

## 2017-02-16 RX ADMIN — SODIUM CHLORIDE 60 MILLILITER(S): 9 INJECTION, SOLUTION INTRAVENOUS at 08:49

## 2017-02-16 RX ADMIN — HEPARIN SODIUM 5000 UNIT(S): 5000 INJECTION INTRAVENOUS; SUBCUTANEOUS at 21:41

## 2017-02-16 RX ADMIN — Medication 40 MILLIGRAM(S): at 13:51

## 2017-02-16 RX ADMIN — Medication: at 12:21

## 2017-02-16 RX ADMIN — Medication 40 MILLIGRAM(S): at 21:41

## 2017-02-16 RX ADMIN — Medication 3 UNIT(S): at 17:32

## 2017-02-16 RX ADMIN — Medication 25 MILLIGRAM(S): at 05:25

## 2017-02-16 RX ADMIN — Medication 3 MILLILITER(S): at 09:54

## 2017-02-16 RX ADMIN — HEPARIN SODIUM 5000 UNIT(S): 5000 INJECTION INTRAVENOUS; SUBCUTANEOUS at 05:25

## 2017-02-16 RX ADMIN — Medication 3 MILLILITER(S): at 03:42

## 2017-02-16 RX ADMIN — Medication 3 UNIT(S): at 08:48

## 2017-02-16 RX ADMIN — Medication 3 UNIT(S): at 12:21

## 2017-02-16 RX ADMIN — Medication 3 MILLILITER(S): at 21:14

## 2017-02-16 RX ADMIN — SODIUM CHLORIDE 75 MILLILITER(S): 9 INJECTION, SOLUTION INTRAVENOUS at 16:25

## 2017-02-16 RX ADMIN — Medication 40 MILLIGRAM(S): at 05:28

## 2017-02-16 RX ADMIN — Medication 25 MILLIGRAM(S): at 17:32

## 2017-02-16 RX ADMIN — INSULIN GLARGINE 10 UNIT(S): 100 INJECTION, SOLUTION SUBCUTANEOUS at 21:41

## 2017-02-17 VITALS
OXYGEN SATURATION: 100 % | RESPIRATION RATE: 16 BRPM | TEMPERATURE: 98 F | SYSTOLIC BLOOD PRESSURE: 146 MMHG | DIASTOLIC BLOOD PRESSURE: 69 MMHG | HEART RATE: 86 BPM

## 2017-02-17 LAB
BUN SERPL-MCNC: 56 MG/DL — HIGH (ref 7–23)
CALCIUM SERPL-MCNC: 8 MG/DL — LOW (ref 8.4–10.5)
CHLORIDE SERPL-SCNC: 106 MMOL/L — SIGNIFICANT CHANGE UP (ref 98–107)
CO2 SERPL-SCNC: 27 MMOL/L — SIGNIFICANT CHANGE UP (ref 22–31)
CREAT SERPL-MCNC: 1.94 MG/DL — HIGH (ref 0.5–1.3)
GLUCOSE SERPL-MCNC: 91 MG/DL — SIGNIFICANT CHANGE UP (ref 70–99)
MAGNESIUM SERPL-MCNC: 1.2 MG/DL — LOW (ref 1.6–2.6)
PHOSPHATE SERPL-MCNC: 3.2 MG/DL — SIGNIFICANT CHANGE UP (ref 2.5–4.5)
POTASSIUM SERPL-MCNC: 3.5 MMOL/L — SIGNIFICANT CHANGE UP (ref 3.5–5.3)
POTASSIUM SERPL-SCNC: 3.5 MMOL/L — SIGNIFICANT CHANGE UP (ref 3.5–5.3)
SODIUM SERPL-SCNC: 149 MMOL/L — HIGH (ref 135–145)

## 2017-02-17 PROCEDURE — 99232 SBSQ HOSP IP/OBS MODERATE 35: CPT | Mod: GC

## 2017-02-17 RX ORDER — INSULIN LISPRO 100/ML
3 VIAL (ML) SUBCUTANEOUS
Qty: 0 | Refills: 0 | COMMUNITY
Start: 2017-02-17

## 2017-02-17 RX ORDER — INSULIN GLARGINE 100 [IU]/ML
5 INJECTION, SOLUTION SUBCUTANEOUS
Qty: 0 | Refills: 0 | COMMUNITY
Start: 2017-02-17

## 2017-02-17 RX ORDER — MAGNESIUM SULFATE 500 MG/ML
1 VIAL (ML) INJECTION ONCE
Qty: 0 | Refills: 0 | Status: COMPLETED | OUTPATIENT
Start: 2017-02-17 | End: 2017-02-17

## 2017-02-17 RX ORDER — INSULIN GLARGINE 100 [IU]/ML
10 INJECTION, SOLUTION SUBCUTANEOUS
Qty: 0 | Refills: 0 | COMMUNITY
Start: 2017-02-17

## 2017-02-17 RX ADMIN — Medication 3 UNIT(S): at 13:17

## 2017-02-17 RX ADMIN — Medication 40 MILLIGRAM(S): at 13:32

## 2017-02-17 RX ADMIN — Medication 3 MILLILITER(S): at 09:29

## 2017-02-17 RX ADMIN — Medication 25 MILLIGRAM(S): at 06:40

## 2017-02-17 RX ADMIN — Medication 3 MILLILITER(S): at 04:24

## 2017-02-17 RX ADMIN — HEPARIN SODIUM 5000 UNIT(S): 5000 INJECTION INTRAVENOUS; SUBCUTANEOUS at 13:31

## 2017-02-17 RX ADMIN — Medication 40 MILLIGRAM(S): at 06:39

## 2017-02-17 RX ADMIN — Medication 100 GRAM(S): at 09:34

## 2017-02-17 RX ADMIN — Medication 3 UNIT(S): at 09:34

## 2017-02-17 RX ADMIN — HEPARIN SODIUM 5000 UNIT(S): 5000 INJECTION INTRAVENOUS; SUBCUTANEOUS at 06:40

## 2017-02-17 RX ADMIN — AMLODIPINE BESYLATE 10 MILLIGRAM(S): 2.5 TABLET ORAL at 06:39

## 2017-02-19 ENCOUNTER — INPATIENT (INPATIENT)
Facility: HOSPITAL | Age: 82
LOS: 6 days | End: 2017-02-26
Attending: INTERNAL MEDICINE | Admitting: INTERNAL MEDICINE
Payer: MEDICARE

## 2017-02-19 VITALS
OXYGEN SATURATION: 100 % | RESPIRATION RATE: 24 BRPM | HEART RATE: 93 BPM | DIASTOLIC BLOOD PRESSURE: 67 MMHG | TEMPERATURE: 99 F | SYSTOLIC BLOOD PRESSURE: 157 MMHG

## 2017-02-19 DIAGNOSIS — A41.9 SEPSIS, UNSPECIFIED ORGANISM: ICD-10-CM

## 2017-02-19 DIAGNOSIS — I10 ESSENTIAL (PRIMARY) HYPERTENSION: ICD-10-CM

## 2017-02-19 DIAGNOSIS — J96.01 ACUTE RESPIRATORY FAILURE WITH HYPOXIA: ICD-10-CM

## 2017-02-19 DIAGNOSIS — J69.0 PNEUMONITIS DUE TO INHALATION OF FOOD AND VOMIT: ICD-10-CM

## 2017-02-19 DIAGNOSIS — E87.0 HYPEROSMOLALITY AND HYPERNATREMIA: ICD-10-CM

## 2017-02-19 DIAGNOSIS — E11.9 TYPE 2 DIABETES MELLITUS WITHOUT COMPLICATIONS: ICD-10-CM

## 2017-02-19 DIAGNOSIS — N30.00 ACUTE CYSTITIS WITHOUT HEMATURIA: ICD-10-CM

## 2017-02-19 DIAGNOSIS — Z41.8 ENCOUNTER FOR OTHER PROCEDURES FOR PURPOSES OTHER THAN REMEDYING HEALTH STATE: ICD-10-CM

## 2017-02-19 DIAGNOSIS — D64.9 ANEMIA, UNSPECIFIED: ICD-10-CM

## 2017-02-19 DIAGNOSIS — N17.9 ACUTE KIDNEY FAILURE, UNSPECIFIED: ICD-10-CM

## 2017-02-19 DIAGNOSIS — J81.1 CHRONIC PULMONARY EDEMA: ICD-10-CM

## 2017-02-19 PROBLEM — E78.5 HYPERLIPIDEMIA, UNSPECIFIED: Chronic | Status: ACTIVE | Noted: 2017-01-27

## 2017-02-19 LAB
ALBUMIN SERPL ELPH-MCNC: 2.6 G/DL — LOW (ref 3.3–5)
ALP SERPL-CCNC: 69 U/L — SIGNIFICANT CHANGE UP (ref 40–120)
ALT FLD-CCNC: 11 U/L — SIGNIFICANT CHANGE UP (ref 4–33)
APPEARANCE UR: SIGNIFICANT CHANGE UP
AST SERPL-CCNC: 14 U/L — SIGNIFICANT CHANGE UP (ref 4–32)
B PERT DNA SPEC QL NAA+PROBE: SIGNIFICANT CHANGE UP
BACTERIA # UR AUTO: SIGNIFICANT CHANGE UP
BASE EXCESS BLDA CALC-SCNC: 4.6 MMOL/L — SIGNIFICANT CHANGE UP
BASE EXCESS BLDV CALC-SCNC: 5.5 MMOL/L — SIGNIFICANT CHANGE UP
BASOPHILS # BLD AUTO: 0.02 K/UL — SIGNIFICANT CHANGE UP (ref 0–0.2)
BASOPHILS NFR BLD AUTO: 0.3 % — SIGNIFICANT CHANGE UP (ref 0–2)
BILIRUB SERPL-MCNC: 0.5 MG/DL — SIGNIFICANT CHANGE UP (ref 0.2–1.2)
BILIRUB UR-MCNC: NEGATIVE — SIGNIFICANT CHANGE UP
BLOOD GAS VENOUS - CREATININE: 2.51 MG/DL — HIGH (ref 0.5–1.3)
BLOOD UR QL VISUAL: HIGH
BUN SERPL-MCNC: 54 MG/DL — HIGH (ref 7–23)
C PNEUM DNA SPEC QL NAA+PROBE: NOT DETECTED — SIGNIFICANT CHANGE UP
CALCIUM SERPL-MCNC: 8.5 MG/DL — SIGNIFICANT CHANGE UP (ref 8.4–10.5)
CHLORIDE BLDA-SCNC: 118 MMOL/L — HIGH (ref 96–108)
CHLORIDE BLDV-SCNC: 119 MMOL/L — HIGH (ref 96–108)
CHLORIDE SERPL-SCNC: 112 MMOL/L — HIGH (ref 98–107)
CO2 SERPL-SCNC: 28 MMOL/L — SIGNIFICANT CHANGE UP (ref 22–31)
COLOR SPEC: YELLOW — SIGNIFICANT CHANGE UP
CREAT BLDA-MCNC: 2.35 MG/DL — HIGH (ref 0.5–1.3)
CREAT SERPL-MCNC: 2.32 MG/DL — HIGH (ref 0.5–1.3)
EOSINOPHIL # BLD AUTO: 0 K/UL — SIGNIFICANT CHANGE UP (ref 0–0.5)
EOSINOPHIL NFR BLD AUTO: 0 % — SIGNIFICANT CHANGE UP (ref 0–6)
FLUAV H1 2009 PAND RNA SPEC QL NAA+PROBE: NOT DETECTED — SIGNIFICANT CHANGE UP
FLUAV H1 RNA SPEC QL NAA+PROBE: NOT DETECTED — SIGNIFICANT CHANGE UP
FLUAV H3 RNA SPEC QL NAA+PROBE: NOT DETECTED — SIGNIFICANT CHANGE UP
FLUAV SUBTYP SPEC NAA+PROBE: SIGNIFICANT CHANGE UP
FLUBV RNA SPEC QL NAA+PROBE: NOT DETECTED — SIGNIFICANT CHANGE UP
GAS PNL BLDV: 150 MMOL/L — HIGH (ref 136–146)
GLUCOSE BLDA-MCNC: 203 MG/DL — HIGH (ref 70–99)
GLUCOSE BLDV-MCNC: 144 — HIGH (ref 70–99)
GLUCOSE SERPL-MCNC: 153 MG/DL — HIGH (ref 70–99)
GLUCOSE UR-MCNC: NEGATIVE — SIGNIFICANT CHANGE UP
HADV DNA SPEC QL NAA+PROBE: NOT DETECTED — SIGNIFICANT CHANGE UP
HCO3 BLDA-SCNC: 28 MMOL/L — HIGH (ref 22–26)
HCO3 BLDV-SCNC: 29 MMOL/L — HIGH (ref 20–27)
HCOV 229E RNA SPEC QL NAA+PROBE: NOT DETECTED — SIGNIFICANT CHANGE UP
HCOV HKU1 RNA SPEC QL NAA+PROBE: NOT DETECTED — SIGNIFICANT CHANGE UP
HCOV NL63 RNA SPEC QL NAA+PROBE: NOT DETECTED — SIGNIFICANT CHANGE UP
HCOV OC43 RNA SPEC QL NAA+PROBE: NOT DETECTED — SIGNIFICANT CHANGE UP
HCT VFR BLD CALC: 26.7 % — LOW (ref 34.5–45)
HCT VFR BLDA CALC: 25.8 % — LOW (ref 34.5–46.5)
HCT VFR BLDV CALC: 25.7 % — LOW (ref 34.5–45)
HGB BLD-MCNC: 7.9 G/DL — LOW (ref 11.5–15.5)
HGB BLDA-MCNC: 8.3 G/DL — LOW (ref 11.5–15.5)
HGB BLDV-MCNC: 8.3 G/DL — LOW (ref 11.5–15.5)
HMPV RNA SPEC QL NAA+PROBE: NOT DETECTED — SIGNIFICANT CHANGE UP
HPIV1 RNA SPEC QL NAA+PROBE: NOT DETECTED — SIGNIFICANT CHANGE UP
HPIV2 RNA SPEC QL NAA+PROBE: NOT DETECTED — SIGNIFICANT CHANGE UP
HPIV3 RNA SPEC QL NAA+PROBE: NOT DETECTED — SIGNIFICANT CHANGE UP
HPIV4 RNA SPEC QL NAA+PROBE: NOT DETECTED — SIGNIFICANT CHANGE UP
IMM GRANULOCYTES NFR BLD AUTO: 0.7 % — SIGNIFICANT CHANGE UP (ref 0–1.5)
KETONES UR-MCNC: NEGATIVE — SIGNIFICANT CHANGE UP
LACTATE BLDA-SCNC: 0.6 MMOL/L — SIGNIFICANT CHANGE UP (ref 0.5–2)
LACTATE BLDV-MCNC: 1 MMOL/L — SIGNIFICANT CHANGE UP (ref 0.5–2)
LEUKOCYTE ESTERASE UR-ACNC: HIGH
LYMPHOCYTES # BLD AUTO: 1.34 K/UL — SIGNIFICANT CHANGE UP (ref 1–3.3)
LYMPHOCYTES # BLD AUTO: 17.6 % — SIGNIFICANT CHANGE UP (ref 13–44)
M PNEUMO DNA SPEC QL NAA+PROBE: NOT DETECTED — SIGNIFICANT CHANGE UP
MCHC RBC-ENTMCNC: 26.8 PG — LOW (ref 27–34)
MCHC RBC-ENTMCNC: 29.6 % — LOW (ref 32–36)
MCV RBC AUTO: 90.5 FL — SIGNIFICANT CHANGE UP (ref 80–100)
MONOCYTES # BLD AUTO: 0.62 K/UL — SIGNIFICANT CHANGE UP (ref 0–0.9)
MONOCYTES NFR BLD AUTO: 8.2 % — SIGNIFICANT CHANGE UP (ref 2–14)
NEUTROPHILS # BLD AUTO: 5.57 K/UL — SIGNIFICANT CHANGE UP (ref 1.8–7.4)
NEUTROPHILS NFR BLD AUTO: 73.2 % — SIGNIFICANT CHANGE UP (ref 43–77)
NITRITE UR-MCNC: NEGATIVE — SIGNIFICANT CHANGE UP
PCO2 BLDA: 43 MMHG — SIGNIFICANT CHANGE UP (ref 32–48)
PCO2 BLDV: 47 MMHG — SIGNIFICANT CHANGE UP (ref 41–51)
PH BLDA: 7.44 PH — SIGNIFICANT CHANGE UP (ref 7.35–7.45)
PH BLDV: 7.42 PH — SIGNIFICANT CHANGE UP (ref 7.32–7.43)
PH UR: 6.5 — SIGNIFICANT CHANGE UP (ref 4.6–8)
PLATELET # BLD AUTO: 203 K/UL — SIGNIFICANT CHANGE UP (ref 150–400)
PMV BLD: 10.2 FL — SIGNIFICANT CHANGE UP (ref 7–13)
PO2 BLDA: 52 MMHG — LOW (ref 83–108)
PO2 BLDV: 64 MMHG — HIGH (ref 35–40)
POTASSIUM BLDA-SCNC: 3.5 MMOL/L — SIGNIFICANT CHANGE UP (ref 3.4–4.5)
POTASSIUM BLDV-SCNC: 3.5 MMOL/L — SIGNIFICANT CHANGE UP (ref 3.4–4.5)
POTASSIUM SERPL-MCNC: 3.8 MMOL/L — SIGNIFICANT CHANGE UP (ref 3.5–5.3)
POTASSIUM SERPL-SCNC: 3.8 MMOL/L — SIGNIFICANT CHANGE UP (ref 3.5–5.3)
PROT SERPL-MCNC: 6.5 G/DL — SIGNIFICANT CHANGE UP (ref 6–8.3)
PROT UR-MCNC: 150 — HIGH
RBC # BLD: 2.95 M/UL — LOW (ref 3.8–5.2)
RBC # FLD: 14.6 % — HIGH (ref 10.3–14.5)
RBC CASTS # UR COMP ASSIST: HIGH (ref 0–?)
RSV RNA SPEC QL NAA+PROBE: NOT DETECTED — SIGNIFICANT CHANGE UP
RV+EV RNA SPEC QL NAA+PROBE: NOT DETECTED — SIGNIFICANT CHANGE UP
SAO2 % BLDA: 85.5 % — LOW (ref 95–99)
SAO2 % BLDV: 91.6 % — HIGH (ref 60–85)
SODIUM BLDA-SCNC: 151 MMOL/L — HIGH (ref 136–146)
SODIUM SERPL-SCNC: 152 MMOL/L — HIGH (ref 135–145)
SP GR SPEC: 1.02 — SIGNIFICANT CHANGE UP (ref 1–1.03)
UROBILINOGEN FLD QL: NORMAL E.U. — SIGNIFICANT CHANGE UP (ref 0.1–0.2)
WBC # BLD: 7.6 K/UL — SIGNIFICANT CHANGE UP (ref 3.8–10.5)
WBC # FLD AUTO: 7.6 K/UL — SIGNIFICANT CHANGE UP (ref 3.8–10.5)
WBC CLUMPS #/AREA URNS HPF: PRESENT — HIGH (ref 0–?)
WBC UR QL: >50 — HIGH (ref 0–?)
YEAST BUDDING # UR COMP ASSIST: SIGNIFICANT CHANGE UP

## 2017-02-19 PROCEDURE — 71010: CPT | Mod: 26

## 2017-02-19 PROCEDURE — 71250 CT THORAX DX C-: CPT | Mod: 26

## 2017-02-19 RX ORDER — VERAPAMIL HCL 240 MG
40 CAPSULE, EXTENDED RELEASE PELLETS 24 HR ORAL EVERY 8 HOURS
Qty: 0 | Refills: 0 | Status: DISCONTINUED | OUTPATIENT
Start: 2017-02-19 | End: 2017-02-22

## 2017-02-19 RX ORDER — CEFEPIME 1 G/1
1000 INJECTION, POWDER, FOR SOLUTION INTRAMUSCULAR; INTRAVENOUS ONCE
Qty: 0 | Refills: 0 | Status: COMPLETED | OUTPATIENT
Start: 2017-02-19 | End: 2017-02-19

## 2017-02-19 RX ORDER — GLUCAGON INJECTION, SOLUTION 0.5 MG/.1ML
1 INJECTION, SOLUTION SUBCUTANEOUS ONCE
Qty: 0 | Refills: 0 | Status: DISCONTINUED | OUTPATIENT
Start: 2017-02-19 | End: 2017-02-23

## 2017-02-19 RX ORDER — PIPERACILLIN AND TAZOBACTAM 4; .5 G/20ML; G/20ML
3.38 INJECTION, POWDER, LYOPHILIZED, FOR SOLUTION INTRAVENOUS EVERY 12 HOURS
Qty: 0 | Refills: 0 | Status: DISCONTINUED | OUTPATIENT
Start: 2017-02-19 | End: 2017-02-25

## 2017-02-19 RX ORDER — METOPROLOL TARTRATE 50 MG
25 TABLET ORAL
Qty: 0 | Refills: 0 | Status: DISCONTINUED | OUTPATIENT
Start: 2017-02-19 | End: 2017-02-21

## 2017-02-19 RX ORDER — SODIUM CHLORIDE 9 MG/ML
1000 INJECTION INTRAMUSCULAR; INTRAVENOUS; SUBCUTANEOUS ONCE
Qty: 0 | Refills: 0 | Status: COMPLETED | OUTPATIENT
Start: 2017-02-19 | End: 2017-02-19

## 2017-02-19 RX ORDER — HEPARIN SODIUM 5000 [USP'U]/ML
5000 INJECTION INTRAVENOUS; SUBCUTANEOUS EVERY 12 HOURS
Qty: 0 | Refills: 0 | Status: DISCONTINUED | OUTPATIENT
Start: 2017-02-19 | End: 2017-02-26

## 2017-02-19 RX ORDER — SODIUM CHLORIDE 9 MG/ML
1000 INJECTION, SOLUTION INTRAVENOUS
Qty: 0 | Refills: 0 | Status: DISCONTINUED | OUTPATIENT
Start: 2017-02-19 | End: 2017-02-23

## 2017-02-19 RX ORDER — ACETAMINOPHEN 500 MG
650 TABLET ORAL EVERY 6 HOURS
Qty: 0 | Refills: 0 | Status: DISCONTINUED | OUTPATIENT
Start: 2017-02-19 | End: 2017-02-22

## 2017-02-19 RX ORDER — ERGOCALCIFEROL 1.25 MG/1
1 CAPSULE ORAL
Qty: 0 | Refills: 0 | COMMUNITY

## 2017-02-19 RX ORDER — METOPROLOL TARTRATE 50 MG
25 TABLET ORAL
Qty: 0 | Refills: 0 | Status: DISCONTINUED | OUTPATIENT
Start: 2017-02-19 | End: 2017-02-19

## 2017-02-19 RX ORDER — SODIUM CHLORIDE 9 MG/ML
500 INJECTION INTRAMUSCULAR; INTRAVENOUS; SUBCUTANEOUS ONCE
Qty: 0 | Refills: 0 | Status: COMPLETED | OUTPATIENT
Start: 2017-02-19 | End: 2017-02-19

## 2017-02-19 RX ORDER — SODIUM CHLORIDE 9 MG/ML
1000 INJECTION INTRAMUSCULAR; INTRAVENOUS; SUBCUTANEOUS
Qty: 0 | Refills: 0 | Status: DISCONTINUED | OUTPATIENT
Start: 2017-02-19 | End: 2017-02-20

## 2017-02-19 RX ORDER — INSULIN LISPRO 100/ML
VIAL (ML) SUBCUTANEOUS
Qty: 0 | Refills: 0 | Status: DISCONTINUED | OUTPATIENT
Start: 2017-02-19 | End: 2017-02-22

## 2017-02-19 RX ORDER — ACETAMINOPHEN 500 MG
2 TABLET ORAL
Qty: 0 | Refills: 0 | COMMUNITY

## 2017-02-19 RX ORDER — DEXTROSE 50 % IN WATER 50 %
1 SYRINGE (ML) INTRAVENOUS ONCE
Qty: 0 | Refills: 0 | Status: DISCONTINUED | OUTPATIENT
Start: 2017-02-19 | End: 2017-02-23

## 2017-02-19 RX ORDER — CIPROFLOXACIN LACTATE 400MG/40ML
400 VIAL (ML) INTRAVENOUS ONCE
Qty: 0 | Refills: 0 | Status: COMPLETED | OUTPATIENT
Start: 2017-02-19 | End: 2017-02-19

## 2017-02-19 RX ORDER — VERAPAMIL HCL 240 MG
40 CAPSULE, EXTENDED RELEASE PELLETS 24 HR ORAL EVERY 8 HOURS
Qty: 0 | Refills: 0 | Status: DISCONTINUED | OUTPATIENT
Start: 2017-02-19 | End: 2017-02-19

## 2017-02-19 RX ORDER — AZITHROMYCIN 500 MG/1
500 TABLET, FILM COATED ORAL ONCE
Qty: 0 | Refills: 0 | Status: COMPLETED | OUTPATIENT
Start: 2017-02-19 | End: 2017-02-19

## 2017-02-19 RX ORDER — DEXTROSE 50 % IN WATER 50 %
12.5 SYRINGE (ML) INTRAVENOUS ONCE
Qty: 0 | Refills: 0 | Status: DISCONTINUED | OUTPATIENT
Start: 2017-02-19 | End: 2017-02-23

## 2017-02-19 RX ORDER — VANCOMYCIN HCL 1 G
1000 VIAL (EA) INTRAVENOUS ONCE
Qty: 0 | Refills: 0 | Status: COMPLETED | OUTPATIENT
Start: 2017-02-19 | End: 2017-02-19

## 2017-02-19 RX ORDER — PIPERACILLIN AND TAZOBACTAM 4; .5 G/20ML; G/20ML
3.38 INJECTION, POWDER, LYOPHILIZED, FOR SOLUTION INTRAVENOUS ONCE
Qty: 0 | Refills: 0 | Status: COMPLETED | OUTPATIENT
Start: 2017-02-19 | End: 2017-02-19

## 2017-02-19 RX ORDER — AMLODIPINE BESYLATE 2.5 MG/1
10 TABLET ORAL DAILY
Qty: 0 | Refills: 0 | Status: DISCONTINUED | OUTPATIENT
Start: 2017-02-19 | End: 2017-02-21

## 2017-02-19 RX ADMIN — AMLODIPINE BESYLATE 10 MILLIGRAM(S): 2.5 TABLET ORAL at 22:20

## 2017-02-19 RX ADMIN — SODIUM CHLORIDE 75 MILLILITER(S): 9 INJECTION INTRAMUSCULAR; INTRAVENOUS; SUBCUTANEOUS at 18:49

## 2017-02-19 RX ADMIN — SODIUM CHLORIDE 1000 MILLILITER(S): 9 INJECTION INTRAMUSCULAR; INTRAVENOUS; SUBCUTANEOUS at 14:27

## 2017-02-19 RX ADMIN — Medication 200 MILLIGRAM(S): at 13:50

## 2017-02-19 RX ADMIN — Medication 250 MILLIGRAM(S): at 14:55

## 2017-02-19 RX ADMIN — HEPARIN SODIUM 5000 UNIT(S): 5000 INJECTION INTRAVENOUS; SUBCUTANEOUS at 22:20

## 2017-02-19 RX ADMIN — SODIUM CHLORIDE 75 MILLILITER(S): 9 INJECTION INTRAMUSCULAR; INTRAVENOUS; SUBCUTANEOUS at 22:20

## 2017-02-19 RX ADMIN — CEFEPIME 100 MILLIGRAM(S): 1 INJECTION, POWDER, FOR SOLUTION INTRAMUSCULAR; INTRAVENOUS at 12:58

## 2017-02-19 RX ADMIN — PIPERACILLIN AND TAZOBACTAM 200 GRAM(S): 4; .5 INJECTION, POWDER, LYOPHILIZED, FOR SOLUTION INTRAVENOUS at 21:28

## 2017-02-19 RX ADMIN — Medication 40 MILLIGRAM(S): at 22:25

## 2017-02-19 RX ADMIN — SODIUM CHLORIDE 500 MILLILITER(S): 9 INJECTION INTRAMUSCULAR; INTRAVENOUS; SUBCUTANEOUS at 12:58

## 2017-02-19 RX ADMIN — AZITHROMYCIN 250 MILLIGRAM(S): 500 TABLET, FILM COATED ORAL at 17:42

## 2017-02-19 RX ADMIN — Medication 25 MILLIGRAM(S): at 22:20

## 2017-02-19 NOTE — ED PROVIDER NOTE - CRITICAL CARE PROVIDED
interpretation of diagnostic studies/consult w/ pt's family directly relating to pts condition/documentation/direct patient care (not related to procedure)/telephone consultation with the patient's family/additional history taking

## 2017-02-19 NOTE — ED ADULT NURSE NOTE - CHIEF COMPLAINT QUOTE
Pt brought in from [facility] for fever and SOB x2 days, O2 sat low 90's on her usual dose of O2 (4L), and worsening confusion. Pt tachypneic, lung sounds congested. SPO2 100% on NRB.  PMH: DM, HTN, ARF, Pneumonia/sepsis

## 2017-02-19 NOTE — H&P ADULT. - PROBLEM SELECTOR PLAN 7
--questionable evidence of pulmonary edema on admission CXR but with unclear history of underlying CHF. Patient on Metoprolol and Verapamil as outpatient, will clarify cardiac history with PCP  --no other evidence of peripheral edema on exam  -will check proBNP, TTE to document cardiac function  --caution not to fluid overload --questionable evidence of pulmonary edema on admission CXR but with unclear history of underlying CHF. Patient on Metoprolol and Verapamil as outpatient, will clarify cardiac history with PCP  --no other evidence of peripheral edema on exam  -will check proBNP, TTE to document cardiac function  --caution not to fluid overload  --non-con CT chest as above

## 2017-02-19 NOTE — ED ADULT NURSE NOTE - OBJECTIVE STATEMENT
Patient received into room 8 AA&Ox1 for SOB and difficulty breathing. VS recorded in flow sheet - patients' oxygen saturation 93% on 5L NC. Patient with no s/sx of chest pain, N/V, fever, chills. 20g PIV in place to left FA, labs drawn, medications administered, 500mL bolus NS administered, attg MD to evaluate - will continue to monitor.

## 2017-02-19 NOTE — H&P ADULT. - COMMENTS
ROS significantly limited owing to confusion. Majority of ROS obtained from family and based on objective findings

## 2017-02-19 NOTE — H&P ADULT. - ATTENDING COMMENTS
seen and examined. Above confirmed personally by me.   seen and examined.    Pt lying in bed in mild resp distress at rest    VS /69          Pulse   86         Temp 97.6          O2sat 92 on O2    HEENT MAGAN EOMI no pallor no icterus    Neck supple, no JVD    Lungs poor cooperation  coarse BS b/l   few end ins crackles b/l  scattered exp rhonchi r>l    CVS S1S2 soft ejection murmur LSB, no R/G     Abd soft, non tender  no palpable masses  BS +    Ext no C/C/E    Neuro non focal, AO x 3    Vasc B/L DP pulses palpable    Skin no rash, purpura    Msk no jt selling, deformity     Likely HCAP vs CHF unknown EF  Continue O2 to keep sat > 90%  IV vanco/zosyn    start IV lasix as CT suggestive of pulm edema  Monitor renal function  CKD stage 4 for now will trend creatinine    DNR appropriate  prognosis very poor  consider card eval    D/W admitting resident/ house-staff

## 2017-02-19 NOTE — H&P ADULT. - ASSESSMENT
89 yo F PMH of dementia, HTN, T2DM, HLD with recent admission at [facility] 1/28/17 for hypoxic respiratory failure and sepsis 2/2 aspiration PNA requiring intubation and MICU stay, course also c/b BENJI requiring temporary HD, presenting today with chief complaint of fevers, shortness of breath and hypoxia documented at rehab found to have multifocal opacities on admission CXR consistent with likely aspiration PNA.

## 2017-02-19 NOTE — H&P ADULT. - PROBLEM SELECTOR PLAN 4
--likely hypovolemic hypernatremia given infection with reports of poor PO intake and dry MM on exam  --will give gentle IV maintenance fluids NS @ 75cc/hr, caution not to overload given possible evidence of pulmonary edema on CXR  --trend qd BMP. If not improved with fluids and treatment of infection will pursue further w/u

## 2017-02-19 NOTE — ED PROVIDER NOTE - PROGRESS NOTE DETAILS
patient interment desaturating down to 88%.  increased O2;  family at bedside, full code.  ABG sent.   patient wasn't this confused "yesterday" patient mental status still confused. will attempt BIPAP as a temporizing measure. Tanna RIVAS- pt saturating well on bipap, still deciding for intubation vs bipap, family wants everything to be done MICU eval, took patient off bipap.   MICU had discussion w/ family, patient is DNR/DNI.   Will admit to the floor. hospitalist paged. Discussed need to admit with patient & discussed risk and benefits.  Patient agreed to admission.  Discussed case w/ admitting medicine doctor - agreed to admit to their service.  Paged MAR at [phone] with patient information and my spectralink ([phone])

## 2017-02-19 NOTE — H&P ADULT. - PROBLEM SELECTOR PLAN 3
--evidence of UTI by UA  --unclear how symptomatic patient is as ROS limited but no CVA tenderness on exam, no N/V and absence of leukocytosis supportive of likely not pyelonephritis  --appropriate  pathogen coverage will be attained with Zosyn, continue as concomitant treatment of aspiration PNA and narrow abd as appropriate pending Cx  --f/u Bcx, Ucx

## 2017-02-19 NOTE — H&P ADULT. - PROBLEM SELECTOR PLAN 9
--hold home insulin regimen  --low dose SSI, hypoglycemia protocol and will assess basal/bolus requirements over 24h to determine further needs

## 2017-02-19 NOTE — ED PROVIDER NOTE - ATTENDING CONTRIBUTION TO CARE
Tanna RIVAS- pt with h/o htn, dementia, hld and recent admission for hypoxic resp failure and short course of dialysis sent in for lethargy , fever and hypoxia. Pt is creole speaking and baseline confused and was not responding, only speaks her name and asks for water    pt is alert, lethargic and disoriented but eye open spontaneously, follows command, speaks in full sentences, s1s2 normal, reg b/l rhonchi  with tachypnea but no accessory msucle use, bedside o2 test, on RA 87%, On 5L nasal cannula improved to 95%, 100% on NRB.Pt kept on 5 L nasal cannula, skin warm dry, good turgor, no focal deficits, full rom at all joints, peerl eomi    dx multifocal pna with hypoxia, sepsis

## 2017-02-19 NOTE — H&P ADULT. - PROBLEM SELECTOR PLAN 8
--BP at goal and stable  --continue home dose amlodipine and metoprolol --hypertensive on admission likely in setting of anxiety from respiratory distress  --continue home dose amlodipine and metoprolol, will monitor BP and adjust meds as needed

## 2017-02-19 NOTE — H&P ADULT. - PROBLEM SELECTOR PLAN 1
--patient admitted with desaturations at rehab to 80s in setting of recent admission and MICU stay for hypoxic respiratory failure 2/2 aspiration PNA, now also with evidence of multifocal PNA on admission CXR concerning for recurrent event  -hypoxia evident on ABG, with evidence of metabolic compensation   --already evaluated by MICU, determined no need for intubation  --Supp O2 as needed and wean as tolerated, BiPAP PRN  --low threshold MICU re-eval if O2 sats unresponsive to supplemental O2 interventions  --treat aspiration PNA as below

## 2017-02-19 NOTE — H&P ADULT. - PROBLEM SELECTOR PLAN 5
--unclear baseline Cr, will attempt to obtain records from PCP to determine if pt has baseline CKD  --likely pre-renal in setting of hypovolemia given volume status on exam  --maintenance fluids as above and trend Cr. Pursue further w/u with urine lytes/FeNa if not improved.  --avoid nephrotoxins, renally dose meds including Vanc and zosyn

## 2017-02-19 NOTE — ED PROVIDER NOTE - OBJECTIVE STATEMENT
89 year old female w/ past medical history of dementia, hypertension, diabetes, and hyperlipidemia presents with difficulty breathing. Patient was recently admitted to [facility] in the MICU for hypoxic respiratory failure require intubation and a short course of dialysis. Patient presenting from nursing facility for hypoxia and worsening confusion and fever. Patients Baseline is confusion but that is acutely worsened in the past days.   Patient noted to be desaturating from 80’s on RA, on rebreather the patient is 100%.  Patient was given tylenol prior to ED presentaion  Patient is full code.  Hx taken from chart.  Attempted phone translation w/ Creole  [phone] and patient would not answer.

## 2017-02-19 NOTE — H&P ADULT. - PROBLEM SELECTOR PLAN 6
--normocytic, appears chronic with Hgb baseline around 8.5 but currently below baseline  --no signs of symptoms of active bleed, most likely multifactorial with component of anemia of chronic disease   --will check iron studies

## 2017-02-19 NOTE — H&P ADULT. - HISTORY OF PRESENT ILLNESS
89 yo F PMH of dementia, HTN, T2DM, HLD with recent admission at [facility] 1/28/17 for hypoxic respiratory failure and sepsis 2/2 aspiration PNA requiring intubation and MICU stay, course also c/b BENJI requiring temporary HD, presenting today with chief complaint of fevers, shortness of breath and hypoxia documented at rehab. Per family patient over the last few days at rehab was noted to be more confused than her typical baseline also in setting of fevers, desaturations to 80% on RA improved to 100% on rebreather.     In the ER, patient afebrile, hypertensive to max 181/74, tachypneic with RR 24-30 and satting 100% on rebreather mask. BiPAP was attempted due to what was believed to be ongoing respiratory distress. MICU consulted, had discussion with family and made patient DNR/DNI, no intubation indicated. Labs were notable for: CBC with anemia Hgb 7.9 (baseline appears around 8.5), BMP with hypernatremia to 152, BENJI Cr 2.32, baseline unclear but has been as low as 1.94). RVP (-), UA indicative of infection. CXR with "diffusely prominent indistinct bilateral lung markings with confluent areas of ill-defined patchy opacity could reflect combination of pulmonary edema and superimposed multifocal infection/pneumonia in the proper clinical context. Bilateral pleural effusions again noted"

## 2017-02-19 NOTE — H&P ADULT. - PMH
Alzheimer's dementia without behavioral disturbance, unspecified timing of dementia onset    DM (diabetes mellitus)    HLD (hyperlipidemia)    HTN (hypertension)

## 2017-02-19 NOTE — H&P ADULT. - PROBLEM SELECTOR PLAN 2
--pt with evidence of multifocal PNA on admission CXR, likely cause of hypoxic respiratory failure at this time  --HDS, no evidence of SIRS/sepsis at this time  --as patient also with recent hospital stay, will treat broadly with Vanco/Zosyn. Dose vanco by level, zosyn q12 given BENJI.   --aspiration precautions  --NPO pending S/S eval  -gentle IV maintenance fluids   --f/u Bcx  --trend WBC and fever curve --pt with evidence of multifocal PNA on admission CXR, likely cause of hypoxic respiratory failure at this time  --HDS, no evidence of SIRS/sepsis at this time  --as patient also with recent hospital stay, will treat broadly with Vanco/Zosyn. Dose vanco by level, zosyn q12 given BENJI.   --aspiration precautions  --NPO pending S/S eval  -gentle IV maintenance fluids   --f/u Bcx  --trend WBC and fever curve  --obtain CT non con of chest to better delineate if CXR findings are infectious vs edema

## 2017-02-20 LAB
BASOPHILS # BLD AUTO: 0.01 K/UL — SIGNIFICANT CHANGE UP (ref 0–0.2)
BASOPHILS NFR BLD AUTO: 0.1 % — SIGNIFICANT CHANGE UP (ref 0–2)
BUN SERPL-MCNC: 51 MG/DL — HIGH (ref 7–23)
CALCIUM SERPL-MCNC: 8.4 MG/DL — SIGNIFICANT CHANGE UP (ref 8.4–10.5)
CHLORIDE SERPL-SCNC: 111 MMOL/L — HIGH (ref 98–107)
CO2 SERPL-SCNC: 24 MMOL/L — SIGNIFICANT CHANGE UP (ref 22–31)
CREAT SERPL-MCNC: 2.15 MG/DL — HIGH (ref 0.5–1.3)
EOSINOPHIL # BLD AUTO: 0 K/UL — SIGNIFICANT CHANGE UP (ref 0–0.5)
EOSINOPHIL NFR BLD AUTO: 0 % — SIGNIFICANT CHANGE UP (ref 0–6)
GLUCOSE SERPL-MCNC: 329 MG/DL — HIGH (ref 70–99)
HCT VFR BLD CALC: 28.5 % — LOW (ref 34.5–45)
HGB BLD-MCNC: 8.3 G/DL — LOW (ref 11.5–15.5)
IMM GRANULOCYTES NFR BLD AUTO: 0.6 % — SIGNIFICANT CHANGE UP (ref 0–1.5)
LYMPHOCYTES # BLD AUTO: 1.13 K/UL — SIGNIFICANT CHANGE UP (ref 1–3.3)
LYMPHOCYTES # BLD AUTO: 12.6 % — LOW (ref 13–44)
MCHC RBC-ENTMCNC: 26.5 PG — LOW (ref 27–34)
MCHC RBC-ENTMCNC: 29.1 % — LOW (ref 32–36)
MCV RBC AUTO: 91.1 FL — SIGNIFICANT CHANGE UP (ref 80–100)
MONOCYTES # BLD AUTO: 0.58 K/UL — SIGNIFICANT CHANGE UP (ref 0–0.9)
MONOCYTES NFR BLD AUTO: 6.5 % — SIGNIFICANT CHANGE UP (ref 2–14)
NEUTROPHILS # BLD AUTO: 7.19 K/UL — SIGNIFICANT CHANGE UP (ref 1.8–7.4)
NEUTROPHILS NFR BLD AUTO: 80.2 % — HIGH (ref 43–77)
NT-PROBNP SERPL-SCNC: SIGNIFICANT CHANGE UP PG/ML
PLATELET # BLD AUTO: 206 K/UL — SIGNIFICANT CHANGE UP (ref 150–400)
PMV BLD: 10.5 FL — SIGNIFICANT CHANGE UP (ref 7–13)
POTASSIUM SERPL-MCNC: 3.9 MMOL/L — SIGNIFICANT CHANGE UP (ref 3.5–5.3)
POTASSIUM SERPL-SCNC: 3.9 MMOL/L — SIGNIFICANT CHANGE UP (ref 3.5–5.3)
RBC # BLD: 3.13 M/UL — LOW (ref 3.8–5.2)
RBC # FLD: 14.4 % — SIGNIFICANT CHANGE UP (ref 10.3–14.5)
SODIUM SERPL-SCNC: 151 MMOL/L — HIGH (ref 135–145)
SPECIMEN SOURCE: SIGNIFICANT CHANGE UP
VANCOMYCIN FLD-MCNC: 10.9 UG/ML — SIGNIFICANT CHANGE UP
WBC # BLD: 8.96 K/UL — SIGNIFICANT CHANGE UP (ref 3.8–10.5)
WBC # FLD AUTO: 8.96 K/UL — SIGNIFICANT CHANGE UP (ref 3.8–10.5)

## 2017-02-20 PROCEDURE — 71010: CPT | Mod: 26

## 2017-02-20 PROCEDURE — 93306 TTE W/DOPPLER COMPLETE: CPT | Mod: 26

## 2017-02-20 RX ORDER — VANCOMYCIN HCL 1 G
1000 VIAL (EA) INTRAVENOUS ONCE
Qty: 0 | Refills: 0 | Status: COMPLETED | OUTPATIENT
Start: 2017-02-20 | End: 2017-02-20

## 2017-02-20 RX ORDER — FUROSEMIDE 40 MG
40 TABLET ORAL ONCE
Qty: 0 | Refills: 0 | Status: COMPLETED | OUTPATIENT
Start: 2017-02-20 | End: 2017-02-20

## 2017-02-20 RX ADMIN — Medication 25 MILLIGRAM(S): at 12:16

## 2017-02-20 RX ADMIN — Medication 40 MILLIGRAM(S): at 22:51

## 2017-02-20 RX ADMIN — Medication 40 MILLIGRAM(S): at 15:13

## 2017-02-20 RX ADMIN — Medication 40 MILLIGRAM(S): at 08:58

## 2017-02-20 RX ADMIN — Medication 1: at 17:28

## 2017-02-20 RX ADMIN — PIPERACILLIN AND TAZOBACTAM 25 GRAM(S): 4; .5 INJECTION, POWDER, LYOPHILIZED, FOR SOLUTION INTRAVENOUS at 06:13

## 2017-02-20 RX ADMIN — HEPARIN SODIUM 5000 UNIT(S): 5000 INJECTION INTRAVENOUS; SUBCUTANEOUS at 12:16

## 2017-02-20 RX ADMIN — Medication 250 MILLIGRAM(S): at 15:13

## 2017-02-20 RX ADMIN — AMLODIPINE BESYLATE 10 MILLIGRAM(S): 2.5 TABLET ORAL at 06:14

## 2017-02-20 RX ADMIN — Medication 4: at 06:55

## 2017-02-20 RX ADMIN — Medication 2: at 12:24

## 2017-02-20 RX ADMIN — Medication 40 MILLIGRAM(S): at 06:14

## 2017-02-20 RX ADMIN — PIPERACILLIN AND TAZOBACTAM 25 GRAM(S): 4; .5 INJECTION, POWDER, LYOPHILIZED, FOR SOLUTION INTRAVENOUS at 17:28

## 2017-02-21 LAB
BACTERIA UR CULT: SIGNIFICANT CHANGE UP
BUN SERPL-MCNC: 54 MG/DL — HIGH (ref 7–23)
BUN SERPL-MCNC: 54 MG/DL — HIGH (ref 7–23)
CALCIUM SERPL-MCNC: 8.2 MG/DL — LOW (ref 8.4–10.5)
CALCIUM SERPL-MCNC: 8.3 MG/DL — LOW (ref 8.4–10.5)
CHLORIDE SERPL-SCNC: 112 MMOL/L — HIGH (ref 98–107)
CHLORIDE SERPL-SCNC: 114 MMOL/L — HIGH (ref 98–107)
CO2 SERPL-SCNC: 21 MMOL/L — LOW (ref 22–31)
CO2 SERPL-SCNC: 24 MMOL/L — SIGNIFICANT CHANGE UP (ref 22–31)
CREAT SERPL-MCNC: 2.24 MG/DL — HIGH (ref 0.5–1.3)
CREAT SERPL-MCNC: 2.3 MG/DL — HIGH (ref 0.5–1.3)
FERRITIN SERPL-MCNC: 579 NG/ML — HIGH (ref 15–150)
GLUCOSE SERPL-MCNC: 252 MG/DL — HIGH (ref 70–99)
GLUCOSE SERPL-MCNC: 276 MG/DL — HIGH (ref 70–99)
HCT VFR BLD CALC: 26.9 % — LOW (ref 34.5–45)
HGB BLD-MCNC: 7.8 G/DL — LOW (ref 11.5–15.5)
IRON SATN MFR SERPL: 172 UG/DL — SIGNIFICANT CHANGE UP (ref 140–530)
IRON SATN MFR SERPL: 23 UG/DL — LOW (ref 30–160)
MAGNESIUM SERPL-MCNC: 1.5 MG/DL — LOW (ref 1.6–2.6)
MAGNESIUM SERPL-MCNC: 1.5 MG/DL — LOW (ref 1.6–2.6)
MCHC RBC-ENTMCNC: 26.5 PG — LOW (ref 27–34)
MCHC RBC-ENTMCNC: 29 % — LOW (ref 32–36)
MCV RBC AUTO: 91.5 FL — SIGNIFICANT CHANGE UP (ref 80–100)
PHOSPHATE SERPL-MCNC: 4.3 MG/DL — SIGNIFICANT CHANGE UP (ref 2.5–4.5)
PHOSPHATE SERPL-MCNC: 4.4 MG/DL — SIGNIFICANT CHANGE UP (ref 2.5–4.5)
PLATELET # BLD AUTO: 179 K/UL — SIGNIFICANT CHANGE UP (ref 150–400)
PMV BLD: 10.2 FL — SIGNIFICANT CHANGE UP (ref 7–13)
POTASSIUM SERPL-MCNC: 3.4 MMOL/L — LOW (ref 3.5–5.3)
POTASSIUM SERPL-MCNC: 3.7 MMOL/L — SIGNIFICANT CHANGE UP (ref 3.5–5.3)
POTASSIUM SERPL-SCNC: 3.4 MMOL/L — LOW (ref 3.5–5.3)
POTASSIUM SERPL-SCNC: 3.7 MMOL/L — SIGNIFICANT CHANGE UP (ref 3.5–5.3)
RBC # BLD: 2.94 M/UL — LOW (ref 3.8–5.2)
RBC # FLD: 14.4 % — SIGNIFICANT CHANGE UP (ref 10.3–14.5)
SODIUM SERPL-SCNC: 155 MMOL/L — HIGH (ref 135–145)
SODIUM SERPL-SCNC: 157 MMOL/L — HIGH (ref 135–145)
UIBC SERPL-MCNC: 149 UG/DL — SIGNIFICANT CHANGE UP (ref 110–370)
VANCOMYCIN FLD-MCNC: 20.8 UG/ML — SIGNIFICANT CHANGE UP
WBC # BLD: 6.98 K/UL — SIGNIFICANT CHANGE UP (ref 3.8–10.5)
WBC # FLD AUTO: 6.98 K/UL — SIGNIFICANT CHANGE UP (ref 3.8–10.5)

## 2017-02-21 PROCEDURE — 99222 1ST HOSP IP/OBS MODERATE 55: CPT

## 2017-02-21 RX ORDER — FLUCONAZOLE 150 MG/1
TABLET ORAL
Qty: 0 | Refills: 0 | Status: DISCONTINUED | OUTPATIENT
Start: 2017-02-21 | End: 2017-02-21

## 2017-02-21 RX ORDER — MAGNESIUM SULFATE 500 MG/ML
2 VIAL (ML) INJECTION ONCE
Qty: 0 | Refills: 0 | Status: COMPLETED | OUTPATIENT
Start: 2017-02-21 | End: 2017-02-21

## 2017-02-21 RX ORDER — POTASSIUM CHLORIDE 20 MEQ
10 PACKET (EA) ORAL
Qty: 0 | Refills: 0 | Status: COMPLETED | OUTPATIENT
Start: 2017-02-21 | End: 2017-02-21

## 2017-02-21 RX ORDER — FLUCONAZOLE 150 MG/1
200 TABLET ORAL ONCE
Qty: 0 | Refills: 0 | Status: DISCONTINUED | OUTPATIENT
Start: 2017-02-21 | End: 2017-02-21

## 2017-02-21 RX ORDER — SODIUM CHLORIDE 9 MG/ML
1000 INJECTION, SOLUTION INTRAVENOUS
Qty: 0 | Refills: 0 | Status: DISCONTINUED | OUTPATIENT
Start: 2017-02-21 | End: 2017-02-22

## 2017-02-21 RX ORDER — INSULIN GLARGINE 100 [IU]/ML
4 INJECTION, SOLUTION SUBCUTANEOUS AT BEDTIME
Qty: 0 | Refills: 0 | Status: DISCONTINUED | OUTPATIENT
Start: 2017-02-21 | End: 2017-02-22

## 2017-02-21 RX ORDER — VANCOMYCIN HCL 1 G
1000 VIAL (EA) INTRAVENOUS ONCE
Qty: 0 | Refills: 0 | Status: DISCONTINUED | OUTPATIENT
Start: 2017-02-21 | End: 2017-02-21

## 2017-02-21 RX ADMIN — Medication 1: at 18:36

## 2017-02-21 RX ADMIN — SODIUM CHLORIDE 75 MILLILITER(S): 9 INJECTION, SOLUTION INTRAVENOUS at 11:42

## 2017-02-21 RX ADMIN — INSULIN GLARGINE 4 UNIT(S): 100 INJECTION, SOLUTION SUBCUTANEOUS at 23:44

## 2017-02-21 RX ADMIN — AMLODIPINE BESYLATE 10 MILLIGRAM(S): 2.5 TABLET ORAL at 06:16

## 2017-02-21 RX ADMIN — Medication 50 GRAM(S): at 11:42

## 2017-02-21 RX ADMIN — HEPARIN SODIUM 5000 UNIT(S): 5000 INJECTION INTRAVENOUS; SUBCUTANEOUS at 23:46

## 2017-02-21 RX ADMIN — Medication 25 MILLIGRAM(S): at 00:11

## 2017-02-21 RX ADMIN — Medication 2: at 12:30

## 2017-02-21 RX ADMIN — Medication 40 MILLIGRAM(S): at 06:16

## 2017-02-21 RX ADMIN — PIPERACILLIN AND TAZOBACTAM 25 GRAM(S): 4; .5 INJECTION, POWDER, LYOPHILIZED, FOR SOLUTION INTRAVENOUS at 18:36

## 2017-02-21 RX ADMIN — Medication 3: at 06:18

## 2017-02-21 RX ADMIN — Medication 25 MILLIGRAM(S): at 12:32

## 2017-02-21 RX ADMIN — PIPERACILLIN AND TAZOBACTAM 25 GRAM(S): 4; .5 INJECTION, POWDER, LYOPHILIZED, FOR SOLUTION INTRAVENOUS at 06:16

## 2017-02-21 RX ADMIN — HEPARIN SODIUM 5000 UNIT(S): 5000 INJECTION INTRAVENOUS; SUBCUTANEOUS at 12:31

## 2017-02-21 RX ADMIN — Medication 100 MILLIEQUIVALENT(S): at 13:04

## 2017-02-21 RX ADMIN — Medication 40 MILLIGRAM(S): at 22:19

## 2017-02-21 RX ADMIN — HEPARIN SODIUM 5000 UNIT(S): 5000 INJECTION INTRAVENOUS; SUBCUTANEOUS at 00:12

## 2017-02-21 RX ADMIN — Medication 100 MILLIEQUIVALENT(S): at 13:56

## 2017-02-21 RX ADMIN — Medication 40 MILLIGRAM(S): at 13:58

## 2017-02-21 NOTE — SWALLOW BEDSIDE ASSESSMENT ADULT - DIET PRIOR TO ADMI
MBS completed during last hospital admission on 213/17 with Rx: puree with honey thick liquids via tsp.

## 2017-02-21 NOTE — SWALLOW BEDSIDE ASSESSMENT ADULT - COMMENTS
Pt. familiar to this department from previous admission. MBS completed during last hospital admission on 213/17 with Rx: puree with honey thick liquids via tsp. Silent laryngeal penetration without retrieval with honey thick liquids via cup sip. The pt. was discharged to rehab, and readmitted to this facility with possible aspiration PNA.   The pt. was received in bed awake with supplemental oxygen in place via venti mask set at 50%. The pt. demonstrated increased work of breathing while at rest absent of any PO trials with supplemental oxygen in place exhibiting labored breathing sounds. PO trials were not presented at this time secondary to increased work of breathing. Oral nutrition is contraindicated at this time. Consider temporary non oral means of nutrition, hydration and medication. Reviewed results and recommendations with the pt.'s nurse, Sondra and Team 1 at [facility] [id number].

## 2017-02-21 NOTE — SWALLOW BEDSIDE ASSESSMENT ADULT - SLP PERTINENT HISTORY OF CURRENT PROBLEM
89 y/o F PMH of dementia, HTN, T2DM, HLD with recent admission at [facility] 1/28/17 for hypoxic respiratory failure and sepsis 2/2 aspiration PNA requiring intubation and MICU stay, course also c/b BENJI requiring temporary HD, presenting today with chief complaint of fevers, shortness of breath and hypoxia documented at rehab found to have multifocal opacities on admission CXR consistent with likely aspiration PNA.

## 2017-02-22 LAB
BUN SERPL-MCNC: 53 MG/DL — HIGH (ref 7–23)
CALCIUM SERPL-MCNC: 8.4 MG/DL — SIGNIFICANT CHANGE UP (ref 8.4–10.5)
CHLORIDE SERPL-SCNC: 112 MMOL/L — HIGH (ref 98–107)
CO2 SERPL-SCNC: 23 MMOL/L — SIGNIFICANT CHANGE UP (ref 22–31)
CREAT SERPL-MCNC: 2.24 MG/DL — HIGH (ref 0.5–1.3)
GLUCOSE SERPL-MCNC: 271 MG/DL — HIGH (ref 70–99)
HCT VFR BLD CALC: 27.6 % — LOW (ref 34.5–45)
HGB BLD-MCNC: 8.1 G/DL — LOW (ref 11.5–15.5)
MAGNESIUM SERPL-MCNC: 1.9 MG/DL — SIGNIFICANT CHANGE UP (ref 1.6–2.6)
MCHC RBC-ENTMCNC: 26.6 PG — LOW (ref 27–34)
MCHC RBC-ENTMCNC: 29.3 % — LOW (ref 32–36)
MCV RBC AUTO: 90.8 FL — SIGNIFICANT CHANGE UP (ref 80–100)
PHOSPHATE SERPL-MCNC: 3.8 MG/DL — SIGNIFICANT CHANGE UP (ref 2.5–4.5)
PLATELET # BLD AUTO: 186 K/UL — SIGNIFICANT CHANGE UP (ref 150–400)
PMV BLD: 10.7 FL — SIGNIFICANT CHANGE UP (ref 7–13)
POTASSIUM SERPL-MCNC: 3.6 MMOL/L — SIGNIFICANT CHANGE UP (ref 3.5–5.3)
POTASSIUM SERPL-SCNC: 3.6 MMOL/L — SIGNIFICANT CHANGE UP (ref 3.5–5.3)
RBC # BLD: 3.04 M/UL — LOW (ref 3.8–5.2)
RBC # FLD: 14.6 % — HIGH (ref 10.3–14.5)
SODIUM SERPL-SCNC: 156 MMOL/L — HIGH (ref 135–145)
WBC # BLD: 6.71 K/UL — SIGNIFICANT CHANGE UP (ref 3.8–10.5)
WBC # FLD AUTO: 6.71 K/UL — SIGNIFICANT CHANGE UP (ref 3.8–10.5)

## 2017-02-22 PROCEDURE — 99223 1ST HOSP IP/OBS HIGH 75: CPT

## 2017-02-22 PROCEDURE — 99232 SBSQ HOSP IP/OBS MODERATE 35: CPT

## 2017-02-22 PROCEDURE — 99233 SBSQ HOSP IP/OBS HIGH 50: CPT | Mod: GC

## 2017-02-22 RX ORDER — INSULIN LISPRO 100/ML
VIAL (ML) SUBCUTANEOUS EVERY 12 HOURS
Qty: 0 | Refills: 0 | Status: DISCONTINUED | OUTPATIENT
Start: 2017-02-22 | End: 2017-02-23

## 2017-02-22 RX ORDER — FLUCONAZOLE 150 MG/1
TABLET ORAL
Qty: 0 | Refills: 0 | Status: DISCONTINUED | OUTPATIENT
Start: 2017-02-22 | End: 2017-02-22

## 2017-02-22 RX ORDER — INSULIN LISPRO 100/ML
2 VIAL (ML) SUBCUTANEOUS
Qty: 0 | Refills: 0 | Status: DISCONTINUED | OUTPATIENT
Start: 2017-02-22 | End: 2017-02-22

## 2017-02-22 RX ORDER — FLUCONAZOLE 150 MG/1
100 TABLET ORAL ONCE
Qty: 0 | Refills: 0 | Status: COMPLETED | OUTPATIENT
Start: 2017-02-22 | End: 2017-02-22

## 2017-02-22 RX ORDER — DEXAMETHASONE 0.5 MG/5ML
4 ELIXIR ORAL DAILY
Qty: 0 | Refills: 0 | Status: DISCONTINUED | OUTPATIENT
Start: 2017-02-22 | End: 2017-02-26

## 2017-02-22 RX ORDER — INSULIN GLARGINE 100 [IU]/ML
6 INJECTION, SOLUTION SUBCUTANEOUS AT BEDTIME
Qty: 0 | Refills: 0 | Status: DISCONTINUED | OUTPATIENT
Start: 2017-02-22 | End: 2017-02-24

## 2017-02-22 RX ORDER — HYDROMORPHONE HYDROCHLORIDE 2 MG/ML
0.2 INJECTION INTRAMUSCULAR; INTRAVENOUS; SUBCUTANEOUS
Qty: 0 | Refills: 0 | Status: DISCONTINUED | OUTPATIENT
Start: 2017-02-22 | End: 2017-02-24

## 2017-02-22 RX ORDER — METOPROLOL TARTRATE 50 MG
5 TABLET ORAL EVERY 8 HOURS
Qty: 0 | Refills: 0 | Status: DISCONTINUED | OUTPATIENT
Start: 2017-02-22 | End: 2017-02-24

## 2017-02-22 RX ORDER — CHLORHEXIDINE GLUCONATE 213 G/1000ML
15 SOLUTION TOPICAL
Qty: 0 | Refills: 0 | Status: DISCONTINUED | OUTPATIENT
Start: 2017-02-22 | End: 2017-02-26

## 2017-02-22 RX ORDER — ACETAMINOPHEN 500 MG
650 TABLET ORAL EVERY 6 HOURS
Qty: 0 | Refills: 0 | Status: DISCONTINUED | OUTPATIENT
Start: 2017-02-22 | End: 2017-02-26

## 2017-02-22 RX ADMIN — Medication 5 MILLIGRAM(S): at 14:48

## 2017-02-22 RX ADMIN — HEPARIN SODIUM 5000 UNIT(S): 5000 INJECTION INTRAVENOUS; SUBCUTANEOUS at 12:07

## 2017-02-22 RX ADMIN — INSULIN GLARGINE 6 UNIT(S): 100 INJECTION, SOLUTION SUBCUTANEOUS at 22:45

## 2017-02-22 RX ADMIN — Medication 2: at 06:23

## 2017-02-22 RX ADMIN — Medication 40 MILLIGRAM(S): at 05:48

## 2017-02-22 RX ADMIN — FLUCONAZOLE 50 MILLIGRAM(S): 150 TABLET ORAL at 14:56

## 2017-02-22 RX ADMIN — PIPERACILLIN AND TAZOBACTAM 25 GRAM(S): 4; .5 INJECTION, POWDER, LYOPHILIZED, FOR SOLUTION INTRAVENOUS at 17:57

## 2017-02-22 RX ADMIN — PIPERACILLIN AND TAZOBACTAM 25 GRAM(S): 4; .5 INJECTION, POWDER, LYOPHILIZED, FOR SOLUTION INTRAVENOUS at 05:47

## 2017-02-22 RX ADMIN — Medication 5 MILLIGRAM(S): at 22:45

## 2017-02-22 RX ADMIN — HYDROMORPHONE HYDROCHLORIDE 0.2 MILLIGRAM(S): 2 INJECTION INTRAMUSCULAR; INTRAVENOUS; SUBCUTANEOUS at 22:46

## 2017-02-22 RX ADMIN — HYDROMORPHONE HYDROCHLORIDE 0.2 MILLIGRAM(S): 2 INJECTION INTRAMUSCULAR; INTRAVENOUS; SUBCUTANEOUS at 14:48

## 2017-02-22 RX ADMIN — CHLORHEXIDINE GLUCONATE 15 MILLILITER(S): 213 SOLUTION TOPICAL at 18:22

## 2017-02-22 RX ADMIN — Medication 4 MILLIGRAM(S): at 14:48

## 2017-02-22 RX ADMIN — Medication 2: at 12:07

## 2017-02-22 RX ADMIN — HEPARIN SODIUM 5000 UNIT(S): 5000 INJECTION INTRAVENOUS; SUBCUTANEOUS at 23:58

## 2017-02-22 RX ADMIN — HYDROMORPHONE HYDROCHLORIDE 0.2 MILLIGRAM(S): 2 INJECTION INTRAMUSCULAR; INTRAVENOUS; SUBCUTANEOUS at 22:53

## 2017-02-23 LAB
BUN SERPL-MCNC: 56 MG/DL — HIGH (ref 7–23)
CALCIUM SERPL-MCNC: 8.6 MG/DL — SIGNIFICANT CHANGE UP (ref 8.4–10.5)
CHLORIDE SERPL-SCNC: 114 MMOL/L — HIGH (ref 98–107)
CO2 SERPL-SCNC: 23 MMOL/L — SIGNIFICANT CHANGE UP (ref 22–31)
CREAT SERPL-MCNC: 2.42 MG/DL — HIGH (ref 0.5–1.3)
GLUCOSE SERPL-MCNC: 283 MG/DL — HIGH (ref 70–99)
HCT VFR BLD CALC: 27.8 % — LOW (ref 34.5–45)
HGB BLD-MCNC: 8 G/DL — LOW (ref 11.5–15.5)
MAGNESIUM SERPL-MCNC: 2 MG/DL — SIGNIFICANT CHANGE UP (ref 1.6–2.6)
MCHC RBC-ENTMCNC: 27 PG — SIGNIFICANT CHANGE UP (ref 27–34)
MCHC RBC-ENTMCNC: 28.8 % — LOW (ref 32–36)
MCV RBC AUTO: 93.9 FL — SIGNIFICANT CHANGE UP (ref 80–100)
PHOSPHATE SERPL-MCNC: 5.1 MG/DL — HIGH (ref 2.5–4.5)
PLATELET # BLD AUTO: 170 K/UL — SIGNIFICANT CHANGE UP (ref 150–400)
PMV BLD: 11 FL — SIGNIFICANT CHANGE UP (ref 7–13)
POTASSIUM SERPL-MCNC: 4 MMOL/L — SIGNIFICANT CHANGE UP (ref 3.5–5.3)
POTASSIUM SERPL-SCNC: 4 MMOL/L — SIGNIFICANT CHANGE UP (ref 3.5–5.3)
RBC # BLD: 2.96 M/UL — LOW (ref 3.8–5.2)
RBC # FLD: 14.5 % — SIGNIFICANT CHANGE UP (ref 10.3–14.5)
SODIUM SERPL-SCNC: 159 MMOL/L — HIGH (ref 135–145)
WBC # BLD: 4.38 K/UL — SIGNIFICANT CHANGE UP (ref 3.8–10.5)
WBC # FLD AUTO: 4.38 K/UL — SIGNIFICANT CHANGE UP (ref 3.8–10.5)

## 2017-02-23 PROCEDURE — 99233 SBSQ HOSP IP/OBS HIGH 50: CPT | Mod: GC

## 2017-02-23 PROCEDURE — 99232 SBSQ HOSP IP/OBS MODERATE 35: CPT

## 2017-02-23 RX ORDER — DEXTROSE 50 % IN WATER 50 %
12.5 SYRINGE (ML) INTRAVENOUS ONCE
Qty: 0 | Refills: 0 | Status: DISCONTINUED | OUTPATIENT
Start: 2017-02-23 | End: 2017-02-25

## 2017-02-23 RX ORDER — INSULIN LISPRO 100/ML
2 VIAL (ML) SUBCUTANEOUS
Qty: 0 | Refills: 0 | Status: DISCONTINUED | OUTPATIENT
Start: 2017-02-23 | End: 2017-02-24

## 2017-02-23 RX ORDER — INSULIN LISPRO 100/ML
1 VIAL (ML) SUBCUTANEOUS ONCE
Qty: 0 | Refills: 0 | Status: DISCONTINUED | OUTPATIENT
Start: 2017-02-23 | End: 2017-02-23

## 2017-02-23 RX ORDER — SODIUM CHLORIDE 9 MG/ML
1000 INJECTION, SOLUTION INTRAVENOUS
Qty: 0 | Refills: 0 | Status: DISCONTINUED | OUTPATIENT
Start: 2017-02-23 | End: 2017-02-24

## 2017-02-23 RX ORDER — INSULIN LISPRO 100/ML
VIAL (ML) SUBCUTANEOUS AT BEDTIME
Qty: 0 | Refills: 0 | Status: DISCONTINUED | OUTPATIENT
Start: 2017-02-23 | End: 2017-02-25

## 2017-02-23 RX ORDER — INSULIN LISPRO 100/ML
VIAL (ML) SUBCUTANEOUS
Qty: 0 | Refills: 0 | Status: DISCONTINUED | OUTPATIENT
Start: 2017-02-23 | End: 2017-02-25

## 2017-02-23 RX ORDER — INSULIN LISPRO 100/ML
1 VIAL (ML) SUBCUTANEOUS ONCE
Qty: 0 | Refills: 0 | Status: COMPLETED | OUTPATIENT
Start: 2017-02-23 | End: 2017-02-23

## 2017-02-23 RX ADMIN — Medication 3: at 07:25

## 2017-02-23 RX ADMIN — Medication 5 MILLIGRAM(S): at 05:37

## 2017-02-23 RX ADMIN — SODIUM CHLORIDE 75 MILLILITER(S): 9 INJECTION, SOLUTION INTRAVENOUS at 21:57

## 2017-02-23 RX ADMIN — Medication 4: at 17:14

## 2017-02-23 RX ADMIN — Medication 5 MILLIGRAM(S): at 13:30

## 2017-02-23 RX ADMIN — Medication 4 MILLIGRAM(S): at 05:37

## 2017-02-23 RX ADMIN — Medication 5 MILLIGRAM(S): at 21:57

## 2017-02-23 RX ADMIN — Medication 1 UNIT(S): at 00:10

## 2017-02-23 RX ADMIN — SODIUM CHLORIDE 75 MILLILITER(S): 9 INJECTION, SOLUTION INTRAVENOUS at 10:14

## 2017-02-23 RX ADMIN — HEPARIN SODIUM 5000 UNIT(S): 5000 INJECTION INTRAVENOUS; SUBCUTANEOUS at 23:38

## 2017-02-23 RX ADMIN — HEPARIN SODIUM 5000 UNIT(S): 5000 INJECTION INTRAVENOUS; SUBCUTANEOUS at 11:58

## 2017-02-23 RX ADMIN — PIPERACILLIN AND TAZOBACTAM 25 GRAM(S): 4; .5 INJECTION, POWDER, LYOPHILIZED, FOR SOLUTION INTRAVENOUS at 05:36

## 2017-02-23 RX ADMIN — CHLORHEXIDINE GLUCONATE 15 MILLILITER(S): 213 SOLUTION TOPICAL at 05:37

## 2017-02-23 RX ADMIN — Medication 6: at 13:27

## 2017-02-23 RX ADMIN — INSULIN GLARGINE 6 UNIT(S): 100 INJECTION, SOLUTION SUBCUTANEOUS at 21:57

## 2017-02-23 RX ADMIN — PIPERACILLIN AND TAZOBACTAM 25 GRAM(S): 4; .5 INJECTION, POWDER, LYOPHILIZED, FOR SOLUTION INTRAVENOUS at 17:02

## 2017-02-23 RX ADMIN — CHLORHEXIDINE GLUCONATE 15 MILLILITER(S): 213 SOLUTION TOPICAL at 18:15

## 2017-02-23 NOTE — PROVIDER CONTACT NOTE (OTHER) - ASSESSMENT
pt on 50% 12 L venturi mask sating 88 % w rr 22 bp 158/67 hr 86-due for metoprolol pt on 50% 12 L venturi mask sating 88 % w rr 22

## 2017-02-24 LAB
BACTERIA BLD CULT: SIGNIFICANT CHANGE UP
BACTERIA BLD CULT: SIGNIFICANT CHANGE UP
BUN SERPL-MCNC: 54 MG/DL — HIGH (ref 7–23)
BUN SERPL-MCNC: 55 MG/DL — HIGH (ref 7–23)
CALCIUM SERPL-MCNC: 8.1 MG/DL — LOW (ref 8.4–10.5)
CALCIUM SERPL-MCNC: 8.6 MG/DL — SIGNIFICANT CHANGE UP (ref 8.4–10.5)
CHLORIDE SERPL-SCNC: 116 MMOL/L — HIGH (ref 98–107)
CHLORIDE SERPL-SCNC: 118 MMOL/L — HIGH (ref 98–107)
CO2 SERPL-SCNC: 24 MMOL/L — SIGNIFICANT CHANGE UP (ref 22–31)
CO2 SERPL-SCNC: 27 MMOL/L — SIGNIFICANT CHANGE UP (ref 22–31)
CREAT SERPL-MCNC: 2.03 MG/DL — HIGH (ref 0.5–1.3)
CREAT SERPL-MCNC: 2.11 MG/DL — HIGH (ref 0.5–1.3)
GLUCOSE SERPL-MCNC: 194 MG/DL — HIGH (ref 70–99)
GLUCOSE SERPL-MCNC: 283 MG/DL — HIGH (ref 70–99)
HCT VFR BLD CALC: 27.8 % — LOW (ref 34.5–45)
HGB BLD-MCNC: 8.3 G/DL — LOW (ref 11.5–15.5)
MAGNESIUM SERPL-MCNC: 1.8 MG/DL — SIGNIFICANT CHANGE UP (ref 1.6–2.6)
MCHC RBC-ENTMCNC: 27.4 PG — SIGNIFICANT CHANGE UP (ref 27–34)
MCHC RBC-ENTMCNC: 29.9 % — LOW (ref 32–36)
MCV RBC AUTO: 91.7 FL — SIGNIFICANT CHANGE UP (ref 80–100)
PHOSPHATE SERPL-MCNC: 3.6 MG/DL — SIGNIFICANT CHANGE UP (ref 2.5–4.5)
PLATELET # BLD AUTO: 161 K/UL — SIGNIFICANT CHANGE UP (ref 150–400)
PMV BLD: 10.4 FL — SIGNIFICANT CHANGE UP (ref 7–13)
POTASSIUM SERPL-MCNC: 3.5 MMOL/L — SIGNIFICANT CHANGE UP (ref 3.5–5.3)
POTASSIUM SERPL-MCNC: 3.8 MMOL/L — SIGNIFICANT CHANGE UP (ref 3.5–5.3)
POTASSIUM SERPL-SCNC: 3.5 MMOL/L — SIGNIFICANT CHANGE UP (ref 3.5–5.3)
POTASSIUM SERPL-SCNC: 3.8 MMOL/L — SIGNIFICANT CHANGE UP (ref 3.5–5.3)
RBC # BLD: 3.03 M/UL — LOW (ref 3.8–5.2)
RBC # FLD: 14.7 % — HIGH (ref 10.3–14.5)
SODIUM SERPL-SCNC: 157 MMOL/L — HIGH (ref 135–145)
SODIUM SERPL-SCNC: 160 MMOL/L — CRITICAL HIGH (ref 135–145)
WBC # BLD: 8.47 K/UL — SIGNIFICANT CHANGE UP (ref 3.8–10.5)
WBC # FLD AUTO: 8.47 K/UL — SIGNIFICANT CHANGE UP (ref 3.8–10.5)

## 2017-02-24 PROCEDURE — 99232 SBSQ HOSP IP/OBS MODERATE 35: CPT

## 2017-02-24 PROCEDURE — 99233 SBSQ HOSP IP/OBS HIGH 50: CPT | Mod: GC

## 2017-02-24 RX ORDER — AMLODIPINE BESYLATE 2.5 MG/1
10 TABLET ORAL DAILY
Qty: 0 | Refills: 0 | Status: DISCONTINUED | OUTPATIENT
Start: 2017-02-24 | End: 2017-02-26

## 2017-02-24 RX ORDER — METOPROLOL TARTRATE 50 MG
25 TABLET ORAL
Qty: 0 | Refills: 0 | Status: DISCONTINUED | OUTPATIENT
Start: 2017-02-24 | End: 2017-02-24

## 2017-02-24 RX ORDER — HYDROMORPHONE HYDROCHLORIDE 2 MG/ML
0.2 INJECTION INTRAMUSCULAR; INTRAVENOUS; SUBCUTANEOUS EVERY 6 HOURS
Qty: 0 | Refills: 0 | Status: DISCONTINUED | OUTPATIENT
Start: 2017-02-24 | End: 2017-02-26

## 2017-02-24 RX ORDER — SODIUM CHLORIDE 9 MG/ML
1000 INJECTION, SOLUTION INTRAVENOUS
Qty: 0 | Refills: 0 | Status: DISCONTINUED | OUTPATIENT
Start: 2017-02-24 | End: 2017-02-26

## 2017-02-24 RX ORDER — HYDROMORPHONE HYDROCHLORIDE 2 MG/ML
0.2 INJECTION INTRAMUSCULAR; INTRAVENOUS; SUBCUTANEOUS
Qty: 0 | Refills: 0 | Status: DISCONTINUED | OUTPATIENT
Start: 2017-02-24 | End: 2017-02-26

## 2017-02-24 RX ORDER — INSULIN GLARGINE 100 [IU]/ML
9 INJECTION, SOLUTION SUBCUTANEOUS AT BEDTIME
Qty: 0 | Refills: 0 | Status: DISCONTINUED | OUTPATIENT
Start: 2017-02-24 | End: 2017-02-25

## 2017-02-24 RX ORDER — INSULIN LISPRO 100/ML
4 VIAL (ML) SUBCUTANEOUS
Qty: 0 | Refills: 0 | Status: DISCONTINUED | OUTPATIENT
Start: 2017-02-24 | End: 2017-02-25

## 2017-02-24 RX ORDER — METOPROLOL TARTRATE 50 MG
25 TABLET ORAL
Qty: 0 | Refills: 0 | Status: DISCONTINUED | OUTPATIENT
Start: 2017-02-24 | End: 2017-02-26

## 2017-02-24 RX ADMIN — AMLODIPINE BESYLATE 10 MILLIGRAM(S): 2.5 TABLET ORAL at 08:22

## 2017-02-24 RX ADMIN — Medication 4 UNIT(S): at 17:52

## 2017-02-24 RX ADMIN — Medication 2: at 23:13

## 2017-02-24 RX ADMIN — PIPERACILLIN AND TAZOBACTAM 25 GRAM(S): 4; .5 INJECTION, POWDER, LYOPHILIZED, FOR SOLUTION INTRAVENOUS at 06:02

## 2017-02-24 RX ADMIN — CHLORHEXIDINE GLUCONATE 15 MILLILITER(S): 213 SOLUTION TOPICAL at 05:56

## 2017-02-24 RX ADMIN — HEPARIN SODIUM 5000 UNIT(S): 5000 INJECTION INTRAVENOUS; SUBCUTANEOUS at 12:11

## 2017-02-24 RX ADMIN — CHLORHEXIDINE GLUCONATE 15 MILLILITER(S): 213 SOLUTION TOPICAL at 17:53

## 2017-02-24 RX ADMIN — HEPARIN SODIUM 5000 UNIT(S): 5000 INJECTION INTRAVENOUS; SUBCUTANEOUS at 23:17

## 2017-02-24 RX ADMIN — PIPERACILLIN AND TAZOBACTAM 25 GRAM(S): 4; .5 INJECTION, POWDER, LYOPHILIZED, FOR SOLUTION INTRAVENOUS at 17:52

## 2017-02-24 RX ADMIN — Medication 8: at 17:52

## 2017-02-24 RX ADMIN — Medication 4: at 08:22

## 2017-02-24 RX ADMIN — SODIUM CHLORIDE 100 MILLILITER(S): 9 INJECTION, SOLUTION INTRAVENOUS at 11:02

## 2017-02-24 RX ADMIN — HYDROMORPHONE HYDROCHLORIDE 0.2 MILLIGRAM(S): 2 INJECTION INTRAMUSCULAR; INTRAVENOUS; SUBCUTANEOUS at 20:24

## 2017-02-24 RX ADMIN — Medication 25 MILLIGRAM(S): at 17:53

## 2017-02-24 RX ADMIN — HYDROMORPHONE HYDROCHLORIDE 0.2 MILLIGRAM(S): 2 INJECTION INTRAMUSCULAR; INTRAVENOUS; SUBCUTANEOUS at 23:17

## 2017-02-24 RX ADMIN — Medication 4 UNIT(S): at 12:12

## 2017-02-24 RX ADMIN — Medication 4 MILLIGRAM(S): at 05:58

## 2017-02-24 RX ADMIN — Medication 5 MILLIGRAM(S): at 05:57

## 2017-02-24 RX ADMIN — HYDROMORPHONE HYDROCHLORIDE 0.2 MILLIGRAM(S): 2 INJECTION INTRAMUSCULAR; INTRAVENOUS; SUBCUTANEOUS at 12:55

## 2017-02-24 RX ADMIN — HYDROMORPHONE HYDROCHLORIDE 0.2 MILLIGRAM(S): 2 INJECTION INTRAMUSCULAR; INTRAVENOUS; SUBCUTANEOUS at 20:02

## 2017-02-24 RX ADMIN — Medication 4 UNIT(S): at 08:22

## 2017-02-24 RX ADMIN — HYDROMORPHONE HYDROCHLORIDE 0.2 MILLIGRAM(S): 2 INJECTION INTRAMUSCULAR; INTRAVENOUS; SUBCUTANEOUS at 23:14

## 2017-02-24 RX ADMIN — INSULIN GLARGINE 9 UNIT(S): 100 INJECTION, SOLUTION SUBCUTANEOUS at 23:13

## 2017-02-24 RX ADMIN — SODIUM CHLORIDE 100 MILLILITER(S): 9 INJECTION, SOLUTION INTRAVENOUS at 12:39

## 2017-02-24 RX ADMIN — HYDROMORPHONE HYDROCHLORIDE 0.2 MILLIGRAM(S): 2 INJECTION INTRAMUSCULAR; INTRAVENOUS; SUBCUTANEOUS at 17:53

## 2017-02-25 LAB
BUN SERPL-MCNC: 57 MG/DL — HIGH (ref 7–23)
CALCIUM SERPL-MCNC: 8 MG/DL — LOW (ref 8.4–10.5)
CHLORIDE SERPL-SCNC: 111 MMOL/L — HIGH (ref 98–107)
CO2 SERPL-SCNC: 25 MMOL/L — SIGNIFICANT CHANGE UP (ref 22–31)
CREAT SERPL-MCNC: 2.28 MG/DL — HIGH (ref 0.5–1.3)
GLUCOSE SERPL-MCNC: 268 MG/DL — HIGH (ref 70–99)
HCT VFR BLD CALC: 27.8 % — LOW (ref 34.5–45)
HGB BLD-MCNC: 7.9 G/DL — LOW (ref 11.5–15.5)
MAGNESIUM SERPL-MCNC: 1.7 MG/DL — SIGNIFICANT CHANGE UP (ref 1.6–2.6)
MCHC RBC-ENTMCNC: 26.8 PG — LOW (ref 27–34)
MCHC RBC-ENTMCNC: 28.4 % — LOW (ref 32–36)
MCV RBC AUTO: 94.2 FL — SIGNIFICANT CHANGE UP (ref 80–100)
PHOSPHATE SERPL-MCNC: 5.3 MG/DL — HIGH (ref 2.5–4.5)
PLATELET # BLD AUTO: 143 K/UL — LOW (ref 150–400)
PMV BLD: 10.7 FL — SIGNIFICANT CHANGE UP (ref 7–13)
POTASSIUM SERPL-MCNC: 3.8 MMOL/L — SIGNIFICANT CHANGE UP (ref 3.5–5.3)
POTASSIUM SERPL-SCNC: 3.8 MMOL/L — SIGNIFICANT CHANGE UP (ref 3.5–5.3)
RBC # BLD: 2.95 M/UL — LOW (ref 3.8–5.2)
RBC # FLD: 14.8 % — HIGH (ref 10.3–14.5)
SODIUM SERPL-SCNC: 152 MMOL/L — HIGH (ref 135–145)
WBC # BLD: 14.95 K/UL — HIGH (ref 3.8–10.5)
WBC # FLD AUTO: 14.95 K/UL — HIGH (ref 3.8–10.5)

## 2017-02-25 PROCEDURE — 99232 SBSQ HOSP IP/OBS MODERATE 35: CPT | Mod: GC

## 2017-02-25 RX ORDER — VERAPAMIL HCL 240 MG
40 CAPSULE, EXTENDED RELEASE PELLETS 24 HR ORAL DAILY
Qty: 0 | Refills: 0 | Status: DISCONTINUED | OUTPATIENT
Start: 2017-02-25 | End: 2017-02-25

## 2017-02-25 RX ORDER — PIPERACILLIN AND TAZOBACTAM 4; .5 G/20ML; G/20ML
3.38 INJECTION, POWDER, LYOPHILIZED, FOR SOLUTION INTRAVENOUS ONCE
Qty: 0 | Refills: 0 | Status: COMPLETED | OUTPATIENT
Start: 2017-02-25 | End: 2017-02-25

## 2017-02-25 RX ORDER — VERAPAMIL HCL 240 MG
40 CAPSULE, EXTENDED RELEASE PELLETS 24 HR ORAL DAILY
Qty: 0 | Refills: 0 | Status: DISCONTINUED | OUTPATIENT
Start: 2017-02-25 | End: 2017-02-26

## 2017-02-25 RX ORDER — INSULIN LISPRO 100/ML
6 VIAL (ML) SUBCUTANEOUS
Qty: 0 | Refills: 0 | Status: DISCONTINUED | OUTPATIENT
Start: 2017-02-25 | End: 2017-02-25

## 2017-02-25 RX ADMIN — Medication 25 MILLIGRAM(S): at 17:52

## 2017-02-25 RX ADMIN — HEPARIN SODIUM 5000 UNIT(S): 5000 INJECTION INTRAVENOUS; SUBCUTANEOUS at 12:01

## 2017-02-25 RX ADMIN — HEPARIN SODIUM 5000 UNIT(S): 5000 INJECTION INTRAVENOUS; SUBCUTANEOUS at 23:29

## 2017-02-25 RX ADMIN — CHLORHEXIDINE GLUCONATE 15 MILLILITER(S): 213 SOLUTION TOPICAL at 17:51

## 2017-02-25 RX ADMIN — Medication 8: at 09:09

## 2017-02-25 RX ADMIN — Medication 40 MILLIGRAM(S): at 17:49

## 2017-02-25 RX ADMIN — AMLODIPINE BESYLATE 10 MILLIGRAM(S): 2.5 TABLET ORAL at 06:04

## 2017-02-25 RX ADMIN — Medication 25 MILLIGRAM(S): at 06:04

## 2017-02-25 RX ADMIN — HYDROMORPHONE HYDROCHLORIDE 0.2 MILLIGRAM(S): 2 INJECTION INTRAMUSCULAR; INTRAVENOUS; SUBCUTANEOUS at 17:49

## 2017-02-25 RX ADMIN — HYDROMORPHONE HYDROCHLORIDE 0.2 MILLIGRAM(S): 2 INJECTION INTRAMUSCULAR; INTRAVENOUS; SUBCUTANEOUS at 12:02

## 2017-02-25 RX ADMIN — HYDROMORPHONE HYDROCHLORIDE 0.2 MILLIGRAM(S): 2 INJECTION INTRAMUSCULAR; INTRAVENOUS; SUBCUTANEOUS at 15:54

## 2017-02-25 RX ADMIN — HYDROMORPHONE HYDROCHLORIDE 0.2 MILLIGRAM(S): 2 INJECTION INTRAMUSCULAR; INTRAVENOUS; SUBCUTANEOUS at 23:30

## 2017-02-25 RX ADMIN — HYDROMORPHONE HYDROCHLORIDE 0.2 MILLIGRAM(S): 2 INJECTION INTRAMUSCULAR; INTRAVENOUS; SUBCUTANEOUS at 01:20

## 2017-02-25 RX ADMIN — HYDROMORPHONE HYDROCHLORIDE 0.2 MILLIGRAM(S): 2 INJECTION INTRAMUSCULAR; INTRAVENOUS; SUBCUTANEOUS at 06:23

## 2017-02-25 RX ADMIN — PIPERACILLIN AND TAZOBACTAM 25 GRAM(S): 4; .5 INJECTION, POWDER, LYOPHILIZED, FOR SOLUTION INTRAVENOUS at 17:48

## 2017-02-25 RX ADMIN — HYDROMORPHONE HYDROCHLORIDE 0.2 MILLIGRAM(S): 2 INJECTION INTRAMUSCULAR; INTRAVENOUS; SUBCUTANEOUS at 06:03

## 2017-02-25 RX ADMIN — PIPERACILLIN AND TAZOBACTAM 25 GRAM(S): 4; .5 INJECTION, POWDER, LYOPHILIZED, FOR SOLUTION INTRAVENOUS at 06:02

## 2017-02-25 RX ADMIN — HYDROMORPHONE HYDROCHLORIDE 0.2 MILLIGRAM(S): 2 INJECTION INTRAMUSCULAR; INTRAVENOUS; SUBCUTANEOUS at 21:39

## 2017-02-25 RX ADMIN — Medication 4 MILLIGRAM(S): at 06:02

## 2017-02-25 NOTE — PROVIDER CONTACT NOTE (OTHER) - RECOMMENDATIONS
hold pre-meal insluin, give sliding scale as ordered and re-evaluate plan for this patient with the team

## 2017-02-25 NOTE — PROVIDER CONTACT NOTE (OTHER) - ACTION/TREATMENT ORDERED:
MD made aware. OK to hold pre meal and given sliding scale and going to contact the attending about further care. will continue to monitor

## 2017-02-25 NOTE — PROVIDER CONTACT NOTE (OTHER) - SITUATION
o2 93% rr 22 bp 158/67 hr 86
bp 160/73  hr 68 rr 20 o2 96% on nonrebreather
bp 173/74 hr 91  r 22 o2 95% supplemental o2
o2 88% w rr 22
o2 95% w rr 22
patient a.m . however patient is too lethargic to eat at this time
pt becoming increasing agitated

## 2017-02-25 NOTE — PROVIDER CONTACT NOTE (OTHER) - BACKGROUND
pt admitted with sepsis. as per pallative on 2/24, patient should be provided comfort care because she is awaiting transfer to inpatient hospice on Monday 2/27

## 2017-02-25 NOTE — PROVIDER CONTACT NOTE (OTHER) - ACTION/TREATMENT ORDERED:
MD made aware. MD stated he would come bedside and assess and evaluate patient. will continue to monitor

## 2017-02-25 NOTE — PROVIDER CONTACT NOTE (OTHER) - ASSESSMENT
patient was lethargic since beginning of shift. patient suddenly became increasing lathargic, SOB, ripping off oxygen mask, unable to redirect in Creole. PRN medication given as order for dyspnea but did not help patient.

## 2017-02-26 VITALS
DIASTOLIC BLOOD PRESSURE: 66 MMHG | HEART RATE: 72 BPM | SYSTOLIC BLOOD PRESSURE: 140 MMHG | OXYGEN SATURATION: 98 % | RESPIRATION RATE: 18 BRPM | TEMPERATURE: 97 F

## 2017-02-26 RX ADMIN — CHLORHEXIDINE GLUCONATE 15 MILLILITER(S): 213 SOLUTION TOPICAL at 06:13

## 2017-02-26 RX ADMIN — HYDROMORPHONE HYDROCHLORIDE 0.2 MILLIGRAM(S): 2 INJECTION INTRAMUSCULAR; INTRAVENOUS; SUBCUTANEOUS at 06:13

## 2017-02-26 RX ADMIN — Medication 4 MILLIGRAM(S): at 06:14

## 2017-02-26 RX ADMIN — Medication 25 MILLIGRAM(S): at 06:14

## 2017-02-26 RX ADMIN — AMLODIPINE BESYLATE 10 MILLIGRAM(S): 2.5 TABLET ORAL at 06:14

## 2017-02-26 RX ADMIN — Medication 40 MILLIGRAM(S): at 06:14

## 2017-02-26 NOTE — DISCHARGE NOTE FOR THE EXPIRED PATIENT - HOSPITAL COURSE
89 yo F PMH of dementia, HTN, T2DM, HLD with recent admission at [facility] 1/28/17 for hypoxic respiratory failure and sepsis 2/2 aspiration PNA requiring intubation and MICU stay, course also c/b BENJI requiring temporary HD, presenting today with chief complaint of fevers, shortness of breath and hypoxia documented at rehab found to have multifocal opacities on admission CXR consistent with likely aspiration PNA and hypoxic respiratory failure. Pt was started on vancomycin/zosyn.   Was also found to be hypernatremic, nephrology consulted and started on 1/2Normal Saline Pt was also tachypneic, placed on face mask. Found to have candiduria, not requiring antifungal therapy as per infectious disease. Speech and swallow eval recommended for pt to be NPO.  Due to worsening respiratory status and hypernatremia, palliative care was consulted. After discussion with daughter, decision made for pleasure feeds and decision made to eventually tx to inpatient hospice and pt was placed on Dilaudid PRN.   On 2/26, Dr. PETER Hudson noted pt did not have heart or lung sounds- pt was pronounced at 9:52AM. Attending and pt's family were notified.  No plan for autopsy.

## 2017-06-11 NOTE — PATIENT PROFILE ADULT. - FUNCTIONAL SCREEN CURRENT LEVEL: BATHING, MLM
Enzo Chaudhary D/C'd.  Discharge instructions including the importance of hydration, the use of OTC medications, informations on knee contusion and the proper follow up recommendations have been provided to the patient/family. New medication, hycet reviewed with mother.  Return precautions given. Questions answered. Verbalized understanding. Pt walked out of ER with family. Pt in NAD, alert and acting age appropriate.      (2) assistive person

## 2018-07-31 NOTE — PATIENT PROFILE ADULT. - NS PRO OT REFERRAL QUES 1 YN
Outpatient Infusion Center Progress Note 1050 Pt admit to [facility] for Simponi Aria q 8 weeks ambulatory in stable condition. Assessment completed. No new concerns voiced. PIV started in left arm with positive blood return. Visit Vitals  /66 (BP 1 Location: Right arm, BP Patient Position: Sitting)  Pulse (!) 55  Temp 97.8 °F (36.6 °C)  Wt 105.7 kg (233 lb)  BMI 35.43 kg/m2 Medications: 
NS @ [facility] Tylenol PO Benadryl Solu-medrol Simponi Aria 
 
1250 Pt tolerated treatment well. PIV removed prior to discharge. D/c home ambulatory in no distress. Pt aware of next appointment scheduled for 9/25/18. no

## 2018-12-24 NOTE — SWALLOW BEDSIDE ASSESSMENT ADULT - SLP PRECAUTIONS/LIMITATIONS: HEARING
patient calls to c/o generalized pelvic pain for the past two months.  Is not very specific about symptoms, just states \"I just don't feel right\".  Currently on OCP; denies chance of pregnancy.  Requests appointment with Dr. Gibbons for evaluation.  Scheduled 19 @ [id number].   within functional limits

## 2019-04-12 NOTE — PATIENT PROFILE ADULT. - FUNCTIONAL SCREEN CURRENT LEVEL: BATHING, MLM
Addended by: NIRU TOVAR on: 4/12/2019 10:08 AM     Modules accepted: Orders    
Addended by: NIRU TOVAR on: 4/12/2019 11:25 AM     Modules accepted: Orders    
(2) assistive person

## 2022-10-18 NOTE — DIETITIAN INITIAL EVALUATION ADULT. - PROBLEM SELECTOR PLAN 4
[Well Developed] : well developed [Well Nourished] : well nourished [No Acute Distress] : no acute distress [Normal Conjunctiva] : normal conjunctiva [Normal S1, S2] : normal S1, S2 [Clear Lung Fields] : clear lung fields [Good Air Entry] : good air entry [No Respiratory Distress] : no respiratory distress  [Soft] : abdomen soft -pt severely acidodic with metabolic and respiratory acidosis  -delta delta 1, no metabolic alkalosis   -will start bicarb gtt  -if pt still acidodic, will consult renal in am    Update: bicarb improved to 17, per MICU team, d/c bicarb gtt [Non Tender] : non-tender [Normal Gait] : normal gait [No Edema] : no edema [No Rash] : no rash [Normal] : moves all extremities, no focal deficits, normal speech [Moves all extremities] : moves all extremities [No Focal Deficits] : no focal deficits [Normal Speech] : normal speech [Alert and Oriented] : alert and oriented

## 2023-08-09 NOTE — H&P ADULT. - NEGATIVE SKIN SYMPTOMS
Received return mail from us to patient -address on file   unable to forward  H.pylori order no rash/no dryness/no itching

## 2023-10-19 NOTE — DIETITIAN INITIAL EVALUATION ADULT. - PROBLEM SELECTOR PLAN 3
-most likely ATN vs prerenal BENJI  -will give IVF and trend BMP  -will obtain urine lytes if Cr does not downtrend with fluids.     Hyperkalemia:  repeat K non hemolyzed was 5.4. No EKG changes. Administered insulin + dextrose. F/u BMP.    Update: low urine output overnight, 50 cc/ contact nephrology Spironolactone Pregnancy And Lactation Text: This medication can cause feminization of the male fetus and should be avoided during pregnancy. The active metabolite is also found in breast milk.

## 2024-05-07 NOTE — H&P ADULT. - PSH
Contact Information: If you have any questions, concerns, pended studies, tests and/or procedures, or emergencies regarding your inpatient behavioral health visit. Please contact [facility] [phone]  and ask to speak to a , nurse or physician. A contact is available 24 hours/ 7 days a week at this number.     Summary of Procedures Performed During your Stay:  Below is a list of major procedures performed during your hospital stay and a summary of results:  - No major procedures performed.    Pending Studies (From admission, onward)      None          Please follow up on the above pending studies with your PCP and/or referring provider.   No significant past surgical history

## 2024-06-08 NOTE — PROVIDER CONTACT NOTE (OTHER) - ASSESSMENT
abscess abscess abscess abscess . how ever patient is too lethargic to eat to provided sliding scale and pre-meal insulin abscess
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
